# Patient Record
Sex: MALE | URBAN - METROPOLITAN AREA
[De-identification: names, ages, dates, MRNs, and addresses within clinical notes are randomized per-mention and may not be internally consistent; named-entity substitution may affect disease eponyms.]

---

## 2018-07-10 ENCOUNTER — OFFICE VISIT (OUTPATIENT)
Dept: SURGERY | Facility: CLINIC | Age: 63
End: 2018-07-10
Payer: COMMERCIAL

## 2018-07-10 VITALS
DIASTOLIC BLOOD PRESSURE: 84 MMHG | WEIGHT: 124.9 LBS | OXYGEN SATURATION: 99 % | BODY MASS INDEX: 20.81 KG/M2 | TEMPERATURE: 97.5 F | HEART RATE: 62 BPM | SYSTOLIC BLOOD PRESSURE: 145 MMHG | HEIGHT: 65 IN

## 2018-07-10 DIAGNOSIS — K62.82 ANAL DYSPLASIA: Primary | ICD-10-CM

## 2018-07-10 ASSESSMENT — PAIN SCALES - GENERAL: PAINLEVEL: NO PAIN (0)

## 2018-07-10 NOTE — PATIENT INSTRUCTIONS
Preventive Care:    Colorectal Cancer Screening: During our visit today, we discussed that it is recommended you receive colorectal cancer screening. Please call or make an appointment with your primary care provider to discuss this. You may also call the [facility] scheduling line ([phone]) to set up a colonoscopy appointment. Sherri TIJERINA LPN

## 2018-07-10 NOTE — NURSING NOTE
The following medication was given:     MEDICATION:  Lidocaine with epinephrine  ROUTE: SQ  SITE: Rectum  DOSE: 1% per 30mL  LOT #: [id number]  : Hospira  EXPIRATION DATE: 2/1/2019  NDC#: 7068899414   Was there drug waste? Yes  Amount of drug waste (mL): 25.  Reason for waste:  Single use vial      Sherri Luciano LPN  July 10, 2018

## 2018-07-10 NOTE — MR AVS SNAPSHOT
After Visit Summary   7/10/2018    Zain Reynolds    MRN: [id number]           Patient Information     Date Of Birth          1955        Visit Information        Provider Department      7/10/2018 9:45 AM Mechelle Young APRN [facility] Colon and Rectal Surgery        Today's Diagnoses     Anal dysplasia    -  1      Care Instructions    Preventive Care:    Colorectal Cancer Screening: During our visit today, we discussed that it is recommended you receive colorectal cancer screening. Please call or make an appointment with your primary care provider to discuss this. You may also call the [facility] scheduling line ([phone]) to set up a colonoscopy appointment. Sherri TIJERINA LPN                Follow-ups after your visit        Who to contact     Please call your clinic at [phone] to:    Ask questions about your health    Make or cancel appointments    Discuss your medicines    Learn about your test results    Speak to your doctor            Additional Information About Your Visit        MyChart Information     [employer] gives you secure access to your electronic health record. If you see a primary care provider, you can also send messages to your care team and make appointments. If you have questions, please call your primary care clinic.  If you do not have a primary care provider, please call [phone] and they will assist you.      [employer] is an electronic gateway that provides easy, online access to your medical records. With [employer], you can request a clinic appointment, read your test results, renew a prescription or communicate with your care team.     To access your existing account, please contact your [facility] or call [phone] for assistance.        Care EveryWhere ID     This is your Care EveryWhere ID. This could be used by other organizations to access your [facility] medical records  [id number]        Your Vitals Were      "Pulse Temperature Height Pulse Oximetry BMI (Body Mass Index)       62 97.5  F (36.4  C) (Oral) 5' 5\" 99% 20.78 kg/m2        Blood Pressure from Last 3 Encounters:   07/10/18 145/84    Weight from Last 3 Encounters:   07/10/18 124 lb 14.4 oz              We Performed the Following     HC ANOSCOPY DX, HRA W/ BIOPSY(IES)     Surgical pathology exam        Primary Care Provider    None Specified       No primary provider on file.        Equal Access to Services     PARAMJIT COX : Hadii aad ku hadasho Soomaali, waaxda luqadaha, qaybta kaalmada adeegyada, waxay roxiein hayjaretn elizabetheg camilatiffanietigist lagwendolynn . So [facility] [phone].    ATENCIÓN: Si habla español, tiene a hauser disposición servicios gratuitos de asistencia lingüística. [facility] [phone].    We comply with applicable federal civil rights laws and [place] laws. We do not discriminate on the basis of race, color, national origin, age, disability, sex, sexual orientation, or gender identity.            Thank you!     Thank you for choosing [facility] COLON AND RECTAL SURGERY  for your care. Our goal is always to provide you with excellent care. Hearing back from our patients is one way we can continue to improve our services. Please take a few minutes to complete the written survey that you may receive in the mail after your visit with us. Thank you!             Your Updated Medication List - Protect others around you: Learn how to safely use, store and throw away your medicines at www.disposemymeds.org.          This list is accurate as of 7/10/18 10:21 AM.  Always use your most recent med list.                   Brand Name Dispense Instructions for use Diagnosis    UNKNOWN TO PATIENT      Pt states takes meds for HIV, blood pressure, and cholesterol    Anal dysplasia         "

## 2018-07-10 NOTE — NURSING NOTE
"Chief Complaint   Patient presents with     High resolution anoscopy     HRA       Vitals:    07/10/18 0859   BP: 145/84   Pulse: 62   Temp: 97.5  F (36.4  C)   TempSrc: Oral   SpO2: 99%   Weight: 124 lb 14.4 oz   Height: 5' 5\"       Body mass index is 20.78 kg/(m^2).      Sherri TIJERINA LPN                        " DC instructions

## 2018-07-10 NOTE — LETTER
7/10/2018       RE: Zain Reynolds  [address]  [facility] [address]     Dear Colleague,    Thank you for referring your patient, Zain Reynolds, to the [facility] COLON AND RECTAL SURGERY at [facility]. Please see a copy of my visit note below.      Colon and Rectal Surgery Clinic High Resolution Anoscopy Note    RE: Zain Reynolds  : 1955  AKI: 7/10/2018    Zain Reynolds is a 63 year old HIV positive male with a history of HSILon anal cytology on 18 who presents today for high resolution anoscopy.     HPI: Zain has reportedly had low grade dysplasia on anal cytology in the past but has never had high resolution anoscopy. He denies any anorectal complaints. He had an anorectal abscess in  but no difficulties since that time. He quit smoking 3 years ago. He has had anoreceptive intercourse in the past but not for the past 30 years. His HIV is under good control.   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 He is on daily aspirin.  He has never had a colonoscopy, although [facility]. Family history of prostate and breast cancer, but no family history of colon cancer.    ASSESSMENT: Written, informed consent was obtained prior to procedure.  Prior to the start of the procedure and with procedural staff participation, I verbally confirmed the patient s identity using two indicators, relevant allergies, that the procedure was appropriate and matched the consent or emergent situation, and that the correct equipment/implants were available. Immediately prior to starting the procedure I conducted the Time Out with the procedural staff and re-confirmed the patient s name, procedure, and site/side. (The Joint Commission universal protocol was followed.)  Yes    Sedation (Moderate or Deep): None    Anal cytology was not obtained with Dacron swab. Digital anal rectal exam was  performed without any palpable lesions. Dilute acetic acid soak was completed for 2 minutes. Lubricant was used to insert the anoscope. Performed high resolution microscopy using the colposcope. The dentate line was viewed in its entirety and was quite subtle. Anal canal is fairly short. Abnormal areas noted were some acetowhitening in the left posterior position with fine punctation. After injection with 1% lidocaine with epinephrine, biopsy was obtained in the left posterior position using a baby Tischler forceps. Fulguration was not performed. Hemostasis was obtained using Monsel solution.  The perianal area was inspected after acetic acid soak. The findings noted were some sebaceous cysts with waxy exudate but no additional acetowhitening, punctation, or verruciform lesions.     The patient tolerated the procedures well.    PLAN: Will follow up with patient with results of biopsy from today. If low grade dysplasia or normal, follow up in 6 months for repeat high resolution anoscopy. If high grade dysplasia would recommend fulguration in the OR with repeat high resolution anoscopy after 3-4 months.  Strongly recommended a colonoscopy. Patient's questions were answered to his stated satisfaction and he is in agreement with this plan.    For details of past medical history, surgical history, family history, medications, allergies, and review of systems, please see details below.    Medical history:  No past medical history on file.    Surgical history:  No past surgical history on file.    Family history:  No family history on file.    Medications:  Current Outpatient Prescriptions   Medication Sig Dispense Refill     UNKNOWN TO PATIENT Pt states takes meds for HIV, blood pressure, and cholesterol       Allergies:  The patienthas no allergies on file.    Social history:  Social History   Substance Use Topics     Smoking status: Not on file     Smokeless tobacco: Not on file     Alcohol use Not on file     Marital  "status: single.    Review of Systems:  Nursing Notes:   Sherri Luciano LPN  7/10/2018  9:01 AM  Signed  Chief Complaint   Patient presents with     High resolution anoscopy     HRA       Vitals:    07/10/18 0859   BP: 145/84   Pulse: 62   Temp: 97.5  F (36.4  C)   TempSrc: Oral   SpO2: 99%   Weight: 124 lb 14.4 oz   Height: 5' 5\"       Body mass index is 20.78 kg/(m^2).      Sherri TIJERINA FAITH                          Sherri Luciano LPN  7/10/2018 10:00 AM  Signed  The following medication was given:     MEDICATION:  Lidocaine with epinephrine  ROUTE: SQ  SITE: Rectum  DOSE: 1% per 30mL  LOT #: [id number]  : Hospira  EXPIRATION DATE: 2/1/2019  NDC#: [id number]   Was there drug waste? Yes  Amount of drug waste (mL): 25.  Reason for waste:  Single use vial      Sherri Luciano LPN  July 10, 2018     This procedure was performed under a collaborative agreement with Dr. Riley Mooney MD, Chief of Colon and Rectal Surgery, [facility].    Mechelle Flores NP-C  Colon and Rectal Surgery  [facility]      This note was created using speech recognition software and may contain unintended word substitutions.         "

## 2018-07-10 NOTE — PROGRESS NOTES
Colon and Rectal Surgery Clinic High Resolution Anoscopy Note    RE: Zain Reynolds  : 1955  AKI: 7/10/2018    Zain Reynolds is a 63 year old HIV positive male with a history of HSILon anal cytology on 18 who presents today for high resolution anoscopy.     HPI: Zain has reportedly had low grade dysplasia on anal cytology in the past but has never had high resolution anoscopy. He denies any anorectal complaints. He had an anorectal abscess in  but no difficulties since that time. He quit smoking 3 years ago. He has had anoreceptive intercourse in the past but not for the past 30 years. His HIV is under good control.   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 He is on daily aspirin.  He has never had a colonoscopy, although [facility]. Family history of prostate and breast cancer, but no family history of colon cancer.    ASSESSMENT: Written, informed consent was obtained prior to procedure.  Prior to the start of the procedure and with procedural staff participation, I verbally confirmed the patient s identity using two indicators, relevant allergies, that the procedure was appropriate and matched the consent or emergent situation, and that the correct equipment/implants were available. Immediately prior to starting the procedure I conducted the Time Out with the procedural staff and re-confirmed the patient s name, procedure, and site/side. (The Joint Commission universal protocol was followed.)  Yes    Sedation (Moderate or Deep): None    Anal cytology was not obtained with Dacron swab. Digital anal rectal exam was performed without any palpable lesions. Dilute acetic acid soak was completed for 2 minutes. Lubricant was used to insert the anoscope. Performed high resolution microscopy using the colposcope. The dentate line was viewed in its entirety and was quite subtle. Anal canal is fairly short. Abnormal areas  noted were some acetowhitening in the left posterior position with fine punctation. After injection with 1% lidocaine with epinephrine, biopsy was obtained in the left posterior position using a baby Tischler forceps. Fulguration was not performed. Hemostasis was obtained using Monsel solution.  The perianal area was inspected after acetic acid soak. The findings noted were some sebaceous cysts with waxy exudate but no additional acetowhitening, punctation, or verruciform lesions.     The patient tolerated the procedures well.    PLAN: Will follow up with patient with results of biopsy from today. If low grade dysplasia or normal, follow up in 6 months for repeat high resolution anoscopy. If high grade dysplasia would recommend fulguration in the OR with repeat high resolution anoscopy after 3-4 months.  Strongly recommended a colonoscopy. Patient's questions were answered to his stated satisfaction and he is in agreement with this plan.    For details of past medical history, surgical history, family history, medications, allergies, and review of systems, please see details below.    Medical history:  No past medical history on file.    Surgical history:  No past surgical history on file.    Family history:  No family history on file.    Medications:  Current Outpatient Prescriptions   Medication Sig Dispense Refill     UNKNOWN TO PATIENT Pt states takes meds for HIV, blood pressure, and cholesterol       Allergies:  The patienthas no allergies on file.    Social history:  Social History   Substance Use Topics     Smoking status: Not on file     Smokeless tobacco: Not on file     Alcohol use Not on file     Marital status: single.    Review of Systems:  Nursing Notes:   Sherri Luciano LPN  7/10/2018  9:01 AM  Signed  Chief Complaint   Patient presents with     High resolution anoscopy     HRA       Vitals:    07/10/18 0859   BP: 145/84   Pulse: 62   Temp: 97.5  F (36.4  C)   TempSrc: Oral   SpO2: 99%   Weight: 124  "lb 14.4 oz   Height: 5' 5\"       Body mass index is 20.78 kg/(m^2).      FAITH VenturaSherri juarez, LPN  7/10/2018 10:00 AM  Signed  The following medication was given:     MEDICATION:  Lidocaine with epinephrine  ROUTE: SQ  SITE: Rectum  DOSE: 1% per 30mL  LOT #: [id number]  : Hospira  EXPIRATION DATE: 2/1/2019  NDC#: [id number]   Was there drug waste? Yes  Amount of drug waste (mL): 25.  Reason for waste:  Single use vial      Sherri AnkitaFAITH winchester  July 10, 2018     This procedure was performed under a collaborative agreement with Dr. Riley Mooney MD, Chief of Colon and Rectal Surgery, [facility].    Mechelle Flores NP-C  Colon and Rectal Surgery  [facility]      This note was created using speech recognition software and may contain unintended word substitutions.    "

## 2018-07-11 ENCOUNTER — TELEPHONE (OUTPATIENT)
Dept: SURGERY | Facility: CLINIC | Age: 63
End: 2018-07-11

## 2018-07-11 DIAGNOSIS — K62.82 ANAL DYSPLASIA: Primary | ICD-10-CM

## 2018-07-11 LAB — COPATH REPORT: NORMAL

## 2018-07-11 ASSESSMENT — PATIENT HEALTH QUESTIONNAIRE - PHQ9: SUM OF ALL RESPONSES TO PHQ QUESTIONS 1-9: 4

## 2018-07-11 NOTE — TELEPHONE ENCOUNTER
a. Does the patient take five or more prescription medications? No  b. Does patient have difficulty walking up two flights of stairs? No  c. Is patient s BMI 35 or greater? No  d. Is patient an insulin dependent diabetic? No  e. Does patient have a history of having a heart attack or a heart surgery of any kind? No  f. Does patient have a history of heart failure? No  g. Does the patient have a history of any type of transplant? No  h. Does the patient use inhalers on a daily basis? No  i. Does the patient have a history of pulmonary hypertension? No  Once the patient is evaluated by PAC contact (ex: Surgery schedulers name and number, RN's name and number, etc..);      Mariia Spears RNCC- Colon and Rectal Surgery  Name of planned surgery: ___________________high resolution anoscopy ___________

## 2018-07-11 NOTE — TELEPHONE ENCOUNTER
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 Would recommend repeat high resolution anoscopy in the OR with fulguration. Patient's questions were answered to his stated satisfaction and he is in agreement with this plan.    REJI Fuchs, NP-C  Colon and Rectal Surgery  [facility]

## 2018-07-16 ENCOUNTER — TRANSFERRED RECORDS (OUTPATIENT)
Dept: HEALTH INFORMATION MANAGEMENT | Facility: CLINIC | Age: 63
End: 2018-07-16

## 2018-08-13 ENCOUNTER — OFFICE VISIT (OUTPATIENT)
Dept: SURGERY | Facility: CLINIC | Age: 63
End: 2018-08-13
Payer: COMMERCIAL

## 2018-08-13 ENCOUNTER — APPOINTMENT (OUTPATIENT)
Dept: SURGERY | Facility: CLINIC | Age: 63
End: 2018-08-13
Payer: COMMERCIAL

## 2018-08-13 ENCOUNTER — ALLIED HEALTH/NURSE VISIT (OUTPATIENT)
Dept: SURGERY | Facility: CLINIC | Age: 63
End: 2018-08-13
Payer: COMMERCIAL

## 2018-08-13 ENCOUNTER — ANESTHESIA EVENT (OUTPATIENT)
Dept: SURGERY | Facility: AMBULATORY SURGERY CENTER | Age: 63
End: 2018-08-13

## 2018-08-13 VITALS
HEIGHT: 65 IN | HEART RATE: 69 BPM | OXYGEN SATURATION: 99 % | SYSTOLIC BLOOD PRESSURE: 152 MMHG | WEIGHT: 124.9 LBS | TEMPERATURE: 97.6 F | DIASTOLIC BLOOD PRESSURE: 86 MMHG | BODY MASS INDEX: 20.81 KG/M2 | RESPIRATION RATE: 18 BRPM

## 2018-08-13 DIAGNOSIS — K62.82 ANAL DYSPLASIA: ICD-10-CM

## 2018-08-13 DIAGNOSIS — Z01.818 PRE-OP EXAMINATION: Primary | ICD-10-CM

## 2018-08-13 LAB
ALBUMIN SERPL-MCNC: 3.7 G/DL (ref 3.4–5)
ALP SERPL-CCNC: 112 U/L (ref 40–150)
ALT SERPL W P-5'-P-CCNC: 16 U/L (ref 0–70)
ANION GAP SERPL CALCULATED.3IONS-SCNC: 6 MMOL/L (ref 3–14)
AST SERPL W P-5'-P-CCNC: 15 U/L (ref 0–45)
BILIRUB SERPL-MCNC: 0.6 MG/DL (ref 0.2–1.3)
BUN SERPL-MCNC: 19 MG/DL (ref 7–30)
CALCIUM SERPL-MCNC: 8.6 MG/DL (ref 8.5–10.1)
CHLORIDE SERPL-SCNC: 108 MMOL/L (ref 94–109)
CO2 SERPL-SCNC: 26 MMOL/L (ref 20–32)
CREAT SERPL-MCNC: 1.52 MG/DL (ref 0.66–1.25)
ERYTHROCYTE [DISTWIDTH] IN BLOOD BY AUTOMATED COUNT: 13.8 % (ref 10–15)
GFR SERPL CREATININE-BSD FRML MDRD: 46 ML/MIN/1.7M2
GLUCOSE SERPL-MCNC: 88 MG/DL (ref 70–99)
HCT VFR BLD AUTO: 46.4 % (ref 40–53)
HGB BLD-MCNC: 15.3 G/DL (ref 13.3–17.7)
INR PPP: 0.95 (ref 0.86–1.14)
MCH RBC QN AUTO: 32.3 PG (ref 26.5–33)
MCHC RBC AUTO-ENTMCNC: 33 G/DL (ref 31.5–36.5)
MCV RBC AUTO: 98 FL (ref 78–100)
PLATELET # BLD AUTO: 149 10E9/L (ref 150–450)
POTASSIUM SERPL-SCNC: 4 MMOL/L (ref 3.4–5.3)
PROT SERPL-MCNC: 7 G/DL (ref 6.8–8.8)
RBC # BLD AUTO: 4.73 10E12/L (ref 4.4–5.9)
SODIUM SERPL-SCNC: 139 MMOL/L (ref 133–144)
WBC # BLD AUTO: 5.6 10E9/L (ref 4–11)

## 2018-08-13 RX ORDER — ASPIRIN 325 MG
81 TABLET ORAL EVERY MORNING
COMMUNITY

## 2018-08-13 ASSESSMENT — LIFESTYLE VARIABLES: TOBACCO_USE: 1

## 2018-08-13 NOTE — MR AVS SNAPSHOT
After Visit Summary   2018    Zain Reynolds    MRN: [id number]           Patient Information     Date Of Birth          1955        Visit Information        Provider Department      2018 11:30 AM Rn, [facility]        Care Instructions    Preparing for Your Surgery      Name:  Zain Reynolds   MRN:  [id number]   :  1955   Today's Date:  2018     Arriving for surgery:  Surgery date:  18  Arrival time:  09:50 am  Please come to:       [facility] [address] [place] [address]    What can I eat or drink?-  You may have solid food or milk products until 8 hours prior to your surgery.  -  You may have water, apple juice or 7up/Sprite until 2 hours prior to your surgery.    Which medicines can I take?    Stop Aspirin, vitamins and supplements one week prior to surgery.  Hold Ibuprofen and Naproxen for 24 hours prior to surgery.   -  Do NOT take these medications in the morning, the day of surgery:      -  Please take these medications the day of surgery:  Tylenol if needed.  Take metoprolol if normally taken in the morning.  Take all other scheduled morning medications.    How do I prepare myself?  -  Take two showers: one the night before surgery; and one the morning of surgery.         Use Scrubcare or Hibiclens to wash from neck down.  You may use your own     shampoo and conditioner. No other hair products.   -  Do NOT use lotion, powder, deodorant, or antiperspirant the day of your surgery.  -  Do NOT wear any jewlery.    - Do not bring your own medications to the hospital, except for inhalers and eye   drops.  -  Bring your ID and insurance card.    Questions or Concerns:  -If you have questions or concerns regarding the day of surgery, please call [phone].     -If you are scheduled at the Ambulatory Surgery Center please call [phone].    -For questions after surgery  please call your surgeons office.                     Follow-ups after your visit        Your next 10 appointments already scheduled     Aug 13, 2018 12:00 PM CDT   (Arrive by 11:45 AM)   PAC Anesthesia Consult with  Pac Anesthesiologist   [facility] ([facility])    [address] [place] [address]  [address] [facility] [address] [phone]            Aug 17, 2018   Procedure with Riley Mooney MD   [facility] ([facility])    [address] [place] [address]  [address] [facility] [address] [phone]           Located in the Clinics and Surgery Center at [address] [place] [address].   parking is very convenient and highly recommended.  is a $6 flat rate fee.  Both  and self parkers should enter the main arrival plaza from [facility]; parking attendants will direct you based on your parking preference.              Future tests that were ordered for you today     Open Future Orders        Priority Expected Expires Ordered    Comprehensive metabolic panel Routine 8/13/2018 9/12/2018 8/13/2018    CBC with platelets Routine 8/13/2018 9/12/2018 8/13/2018    INR Routine 8/13/2018 9/12/2018 8/13/2018            Who to contact     Please call your clinic at [phone] to:    Ask questions about your health    Make or cancel appointments    Discuss your medicines    Learn about your test results    Speak to your doctor            Additional Information About Your Visit        [employer] Information     [employer] gives you secure access to your electronic health record. If you see a primary care provider, you can also send messages to your care team and make appointments. If you have questions, please call your primary care clinic.  If you do not have a primary care provider, please call [phone] and they will assist you.      [employer] is an electronic gateway that provides easy, online access  to your medical records. With [employer], you can request a clinic appointment, read your test results, renew a prescription or communicate with your care team.     To access your existing account, please contact your [facility] or call [phone] for assistance.        Care EveryWhere ID     This is your Care EveryWhere ID. This could be used by other organizations to access your [facility] medical records  [id number]         Blood Pressure from Last 3 Encounters:   08/13/18 152/86   07/10/18 145/84    Weight from Last 3 Encounters:   08/13/18 56.7 kg (124 lb 14.4 oz)   07/10/18 56.7 kg (124 lb 14.4 oz)              Today, you had the following     No orders found for display       Primary Care Provider    None Specified       No primary provider on file.        Equal Access to Services     PARAMJIT COX : Christian Sheth, wafidelina kamara, eder kaalmajesse valdovinos, chioma schmidt . So [facility] [phone].    ATENCIÓN: Si habla español, tiene a hauser disposición servicios gratuitos de asistencia lingüística. Llame al [phone].    We comply with applicable federal civil rights laws and [place] laws. We do not discriminate on the basis of race, color, national origin, age, disability, sex, sexual orientation, or gender identity.            Thank you!     Thank you for choosing [facility]  for your care. Our goal is always to provide you with excellent care. Hearing back from our patients is one way we can continue to improve our services. Please take a few minutes to complete the written survey that you may receive in the mail after your visit with us. Thank you!             Your Updated Medication List - Protect others around you: Learn how to safely use, store and throw away your medicines at www.disposemymeds.org.          This list is accurate as of 8/13/18 11:38 AM.  Always use your most recent med list.                    Brand Name Dispense Instructions for use Diagnosis    aspirin 325 MG tablet      Take 325 mg by mouth every morning        FLEXERIL PO      Take by mouth At Bedtime        K-PHOS PO      Take by mouth every morning        LIPITOR PO      Take by mouth At Bedtime        METOPROLOL TARTRATE PO      Take by mouth 2 times daily        MIRTAZAPINE PO      Take by mouth At Bedtime

## 2018-08-13 NOTE — ANESTHESIA PREPROCEDURE EVALUATION
Anesthesia Evaluation     . Pt has had prior anesthetic. Type: General    No history of anesthetic complications          ROS/MED HX    ENT/Pulmonary: Comment: Precancerous lesion on tongue with surgical excision ~ 2 years ago.    (+)tobacco use (Smoked 1-2 PPD for 39 years.  ), Past use , . .    Neurologic:  - neg neurologic ROS     Cardiovascular: Comment: S/P carotid enterectomy.  Right side ~2003 and left side ~2015.      (+) Dyslipidemia, hypertension--CAD, -CABG-date: 3/3/2003 , . Taking blood thinners : . . . :. . 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  Multiple steps throughout the day.   >4 METS   Hematologic:     (+) History of Transfusion -      Musculoskeletal:   (+) arthritis (Back and right wrist. ), , , -       GI/Hepatic:     (+) hepatitis (Unsure)       Renal/Genitourinary:  - ROS Renal section negative       Endo:  - neg endo ROS       Psychiatric:     (+) psychiatric history anxiety      Infectious Disease:   (+) HIV,       Malignancy:      - no malignancy   Other:    (+) No chance of pregnancy no H/O Chronic Pain,no other significant disability                    Physical Exam  Normal systems: cardiovascular and pulmonary    Airway   Mallampati: I  TM distance: >3 FB  Neck ROM: full    Dental   (+) upper dentures and lower dentures    Cardiovascular   Rhythm and rate: regular and normal      Pulmonary    breath sounds clear to auscultation               PAC Discussion and Assessment    ASA Classification: 3  Case is suitable for: ASC  Anesthetic techniques and relevant risks discussed:   Invasive monitoring and risk discussed:   Types:   Possibility and Risk of blood transfusion discussed:   NPO instructions given:   Additional anesthetic preparation and risks discussed:   Needs early admission to pre-op area:   Other:     PAC Resident/NP Anesthesia  Assessment:  Zain Reynolds is a 63-year-old male scheduled for Examination Under Anesthesia with High Resolution Anoscopy on 8/17/18 with Dr. Mooney at the [facility] under MAC.  Mr. 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  He underwent high resolution anoscopy with biopsy.  Biopsy demonstrated AIN 2.  Above procedure recommended.     Mr. Reynolds presents to PAC with his significant other. Limited records available to me [facility].  He reports he had CABG X3 in 2003 with subsequent right carotid endarterectomy.  He had a left carotid endarterectomy in 2015.  He denies any chest pain, SOB, palpitations, syncope, MONDRAGON, orthopnea, or PND.  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  He reports a h/o tongue dysplasia in the past with surgical intervention without further issues.  He reports a distant history of hepatitis that resolved.  He denies any changes in his bowel or bladder habits.  He would like to proceed with above procedure.      He has the following specific operative considerations:   - METS:  >4. RCRI : Coronary Artery Disease (MI, positive stress test, angina, Qs on EKG).  0.9 % risk of major adverse cardiac event..  - HERNANDO # of risks 3/8 = intermediate  - VTE risk:  0.5-3%.  Increased VTE risk: Recommend use of mechanical/chemical VTE prophylaxis in the tamiko- and postoperative periods.    - Risk of PONV score = 1.  If > 2, anti-emetic intervention recommended.    1.  Cardiology - Known CAD, s/p CABG 3/3/2003.  HTN and HLD. Denies cardiac symptoms.  EKG NSR 69 bpm with non-specific T wave abnormality.  [facility]  Take BB and statin as prescribed DOS.  Hold ASA for 5 days prior to surgery.         - Carotid artery disease, s/p bilateral endarectomy.  Denies any symptoms.      2.  Pulmonary - Remote smoking  cigarette hx.  Marijuana use ~2X/month  3.  Hematology - HIV positive.    4.  GI - anal dysplasia, above surgery planned       - H/O hepatitis, reports resolved without treatment.        - CKD: Suggest that nephrotoxic substances and medications be avoided.  Recommend close monitoring of fluid balance and electrolytes throughout the perioperative period.    5.  HEENT - reports h/o tongue dysplasia, s/p surgical intervention in the past without recurrence  6.  MSK - chronic low back pain, takes cyclobenzaprine as needed  7.  Nuero, Anxiety, take antianxiety DOS.      - Anesthesia considerations:  Refer to PAC assessment in anesthesia records  - CBC & CMP today      Arrival time, NPO, shower and medication instructions provided by nursing staff today.  Preparing For Your Surgery handout given.  Patient was discussed with Dr Jerome. I spent 20 minutes face to face with patient assessing, educating, counseling and/or coordinating care and examining the patient.  Of that 20 minutes, I spent greater than 50% of my time counseling and/or coordinating care.            Reviewed and Signed by PAC Mid-Level Provider/Resident  Mid-Level Provider/Resident: Frances MENDOZA CNP  Date: 8/13/18  Time: 11:07    Attending Anesthesiologist Anesthesia Assessment:  63 year old for EUA with anoscopy in management of anal dysplasia in the setting of HIV. He has known CAD with CABG in 2003 and carotid endarterectomies (bilateral). [facility], no apparent cardiac issues at this time. No further workup indicated in the low risk procedure.    Patient/case discussed with [place]. No need to see patient. Patient is appropriate for the planned procedure without further work-up or medical management.      Reviewed and Signed by PAC Anesthesiologist  Anesthesiologist: elma  Date: 8/13/2018  Time:   Pass/Fail: Pass  Disposition:     PAC Pharmacist Assessment:        Pharmacist:   Date:   Time:      Anesthesia Plan      History & Physical  Review  History and physical reviewed and following examination; no interval change.    ASA Status:  3 .    NPO Status:  > 6 hours    Plan for MAC with Intravenous and Propofol induction. Maintenance will be TIVA.  Reason for MAC:  Deep or markedly invasive procedure (G8)  PONV prophylaxis:  Ondansetron (or other 5HT-3)       Postoperative Care  Postoperative pain management:  IV analgesics.      Consents  Anesthetic plan, risks, benefits and alternatives discussed with:  Patient..                History and physical assessed; Patient examined.   Risks and alternatives presented and discussed. Patient and family agree. All questions answered.      Anival Brooks MD  Staff Anesthesiologist  [phone]

## 2018-08-13 NOTE — PATIENT INSTRUCTIONS
Preparing for Your Surgery      Name:  Zain Reynolds   MRN:  [id number]   :  1955   Today's Date:  2018     Arriving for surgery:  Surgery date:  18  Arrival time:  09:50 am  Please come to:       [facility] [address]    [address] [place] [address]    What can I eat or drink?-  You may have solid food or milk products until 8 hours prior to your surgery.  -  You may have water, apple juice or 7up/Sprite until 2 hours prior to your surgery.    Which medicines can I take?    Stop Aspirin, vitamins and supplements one week prior to surgery.  Hold Ibuprofen and Naproxen for 24 hours prior to surgery.   -  Do NOT take these medications in the morning, the day of surgery:      -  Please take these medications the day of surgery:  Tylenol if needed.  Take metoprolol if normally taken in the morning.  Take all other scheduled morning medications.    How do I prepare myself?  -  Take two showers: one the night before surgery; and one the morning of surgery.         Use Scrubcare or Hibiclens to wash from neck down.  You may use your own     shampoo and conditioner. No other hair products.   -  Do NOT use lotion, powder, deodorant, or antiperspirant the day of your surgery.  -  Do NOT wear any jewlery.    - Do not bring your own medications to the hospital, except for inhalers and eye   drops.  -  Bring your ID and insurance card.    Questions or Concerns:  -If you have questions or concerns regarding the day of surgery, please call [phone].     -If you are scheduled at the [facility] please call [phone].    -For questions after surgery please call your surgeons office.

## 2018-08-13 NOTE — H&P
Pre-Operative H & P     CC:  Preoperative exam to assess for increased cardiopulmonary risk while undergoing surgery and anesthesia.    Date of Encounter: 8/13/2018  Primary Care Physician:  No primary care provider on file.  Reason for visit:     Anal dysplasia      HPI  Zain Reynolds is a 63 year old male who presents for pre-operative H & P in preparation for Examination Under Anesthesia with High Resolution Anoscopy on 8/17/18 with Dr. Mooney at the [facility] under MAC.  Mr. 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  He underwent high resolution anoscopy with biopsy.  Biopsy demonstrated AIN 2.  Above procedure recommended.     Mr. Reynolds presents to PAC with his significant other. Limited records available to me [facility].  He reports he had CABG X3 in 2003 with subsequent right carotid endarterectomy.  He had a left carotid endarterectomy in 2015.  He denies any chest pain, SOB, palpitations, syncope, MONDRAGON, orthopnea, or PND.  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  He reports a h/o tongue dysplasia in the past with surgical intervention without further issues.  He reports a distant history of hepatitis that resolved.  He denies any changes in his bowel or bladder habits.  He would like to proceed with above procedure.       History is obtained from the patient, electronic health record and patient's significant other.     Past Medical History  Past Medical History:   Diagnosis Date     Anal dysplasia      CAD (coronary artery disease)      Cataracts, bilateral      Hepatitis      HIV (human immunodeficiency virus infection) (H)      Hyperlipemia      Hypertension      Tongue dysplasia        Past Surgical History  Past Surgical History:   Procedure Laterality Date     C CABG, ARTERY-VEIN, TWO  03/03/2003     CATARACT IOL, RT/LT        endarectomy Left 2015     endartectomy Right 2003       Hx of Blood transfusions/reactions: yes without reaction     Hx of abnormal bleeding or anti-platelet use: ASA    Steroid use in the last year: denies    Personal or FH with difficulty with Anesthesia:  denies    Prior to Admission Medications  Current Outpatient Prescriptions   Medication Sig Dispense Refill     aspirin 325 MG tablet Take 325 mg by mouth every morning       Atorvastatin Calcium (LIPITOR PO) Take by mouth At Bedtime       Cyclobenzaprine HCl (FLEXERIL PO) Take by mouth At Bedtime       METOPROLOL TARTRATE PO Take by mouth 2 times daily       MIRTAZAPINE PO Take by mouth At Bedtime       Potassium Phosphate Monobasic (K-PHOS PO) Take by mouth every morning         Allergies  No Known Allergies    Social History  Social History     Social History     Marital status: Single     Spouse name: N/A     Number of children: N/A     Years of education: N/A     Occupational History     CNA      Social History Main Topics     Smoking status: Former Smoker     Packs/day: 1.50     Years: 39.00     Types: Cigarettes     Smokeless tobacco: Never Used     Alcohol use Yes      Comment: Rare     Drug use: Yes     Special: Marijuana      Comment: Rare - couple times per month     Sexual activity: Not on file     Other Topics Concern     Not on file     Social History Narrative    In relationship since 1993. Lives in own home.       Family History  Family History   Problem Relation Age of Onset     Alzheimer Disease Mother      Coronary Artery Disease Father      ROS/MED HX    ENT/Pulmonary: Comment: Precancerous lesion on tongue with surgical excision ~ 2 years ago.    (+)tobacco use (Smoked 1-2 PPD for 39 years.  ), Past use , . .    Neurologic:  - neg neurologic ROS     Cardiovascular: Comment: S/P carotid enterectomy.  Right side ~2003 and left side ~2015.      (+) Dyslipidemia, hypertension--CAD, -CABG-date: 3/3/2003 , . Taking blood thinners : . . . :. .  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   >4 METS   Hematologic:     (+) History of Transfusion -      Musculoskeletal:   (+) arthritis (Back and right wrist. ), , , -       GI/Hepatic:     (+) hepatitis (Unsure)       Renal/Genitourinary:  - ROS Renal section negative       Endo:  - neg endo ROS       Psychiatric:     (+) psychiatric history anxiety      Infectious Disease:   (+) HIV,       Malignancy:      - no malignancy   Other:    (+) No chance of pregnancy no H/O Chronic Pain,no other significant disability        Temp: 97.6  F (36.4  C) Temp src: Oral BP: 152/86 Pulse: 69   Resp: 18 SpO2: 99 %         124 lbs 14.4 oz  5' 5\"   Body mass index is 20.78 kg/(m^2).       Physical Exam  Constitutional: Awake, alert, cooperative, no apparent distress, and appears stated age.  Eyes: Pupils equal, round and reactive to light, extra ocular muscles intact, sclera clear, conjunctiva normal.  HENT: Normocephalic, oral pharynx with moist mucus membranes, upper and lower dentures. . No goiter appreciated.   Respiratory: Clear to auscultation bilaterally, no crackles or wheezing.  Cardiovascular: Regular rate and rhythm, normal S1 and S2, and no murmur noted.  Carotids +2, no bruits. No edema. Palpable pulses to radial  DP and PT arteries. Well healed mid-sternal scar.    GI: Normal bowel sounds, soft, non-distended, non-tender, no masses palpated, no hepatosplenomegaly.    Lymph/Hematologic: No cervical lymphadenopathy and no supraclavicular lymphadenopathy.  Genitourinary:  na  Skin: Warm and dry.    Musculoskeletal: Full ROM of neck. There is no redness, warmth, or swelling of the joints. Gross motor strength is normal.    Neurologic: Awake, alert, oriented to name, place and time. Cranial nerves II-XII are grossly intact. Gait is normal. "   Neuropsychiatric: Calm, cooperative. Normal affect.     Labs: (personally reviewed)  Lab Results   Component Value Date    WBC 5.6 08/13/2018     Lab Results   Component Value Date    RBC 4.73 08/13/2018     Lab Results   Component Value Date    HGB 15.3 08/13/2018     Lab Results   Component Value Date    HCT 46.4 08/13/2018     Lab Results   Component Value Date    MCV 98 08/13/2018     Lab Results   Component Value Date    MCH 32.3 08/13/2018     Lab Results   Component Value Date    MCHC 33.0 08/13/2018     Lab Results   Component Value Date    RDW 13.8 08/13/2018     Lab Results   Component Value Date     08/13/2018       Last Comprehensive Metabolic Panel:  Sodium   Date Value Ref Range Status   08/13/2018 139 133 - 144 mmol/L Final     Potassium   Date Value Ref Range Status   08/13/2018 4.0 3.4 - 5.3 mmol/L Final     Chloride   Date Value Ref Range Status   08/13/2018 108 94 - 109 mmol/L Final     Carbon Dioxide   Date Value Ref Range Status   08/13/2018 26 20 - 32 mmol/L Final     Anion Gap   Date Value Ref Range Status   08/13/2018 6 3 - 14 mmol/L Final     Glucose   Date Value Ref Range Status   08/13/2018 88 70 - 99 mg/dL Final     Urea Nitrogen   Date Value Ref Range Status   08/13/2018 19 7 - 30 mg/dL Final     Creatinine   Date Value Ref Range Status   08/13/2018 1.52 (H) 0.66 - 1.25 mg/dL Final     GFR Estimate   Date Value Ref Range Status   08/13/2018 46 (L) >60 mL/min/1.7m2 Final     Comment:     Non  GFR Calc     Calcium   Date Value Ref Range Status   08/13/2018 8.6 8.5 - 10.1 mg/dL Final       Lab Results   Component Value Date    AST 15 08/13/2018     Lab Results   Component Value Date    ALT 16 08/13/2018     No results found for: BILICONJ   Lab Results   Component Value Date    BILITOTAL 0.6 08/13/2018     Lab Results   Component Value Date    ALBUMIN 3.7 08/13/2018     Lab Results   Component Value Date    PROTTOTAL 7.0 08/13/2018      Lab Results   Component Value  Date    ALKPHOS 112 08/13/2018       EKG: Personally reviewed but formal cardiology read pending: NSR 69 bpm Nonspecific T wave abnormality    ASSESSMENT and PLAN  Zain Reynolds is a 63 year old male scheduled to undergo Examination Under Anesthesia with High Resolution Anoscopy on 8/17/18 with Dr. Mooney at the [facility] under MAC.     He has the following specific operative considerations:   - METS:  >4. RCRI : Coronary Artery Disease (MI, positive stress test, angina, Qs on EKG).  0.9 % risk of major adverse cardiac event..  - HERNANDO # of risks 3/8 = intermediate  - VTE risk:  0.5-3%.  Increased VTE risk: Recommend use of mechanical/chemical VTE prophylaxis in the tamiko- and postoperative periods.    - Risk of PONV score = 1.  If > 2, anti-emetic intervention recommended.    1.  Cardiology - Known CAD, s/p CABG 3/3/2003.  HTN and HLD. Denies cardiac symptoms.  EKG NSR 69 bpm with non-specific T wave abnormality.  [facility]  Take BB and statin as prescribed DOS.  Hold ASA for 5 days prior to surgery.         - Carotid artery disease, s/p bilateral endarectomy.  Denies any symptoms.      2.  Pulmonary - Remote cigarette hx.  Marijuana use ~2X/month  3.  Hematology - HIV positive.    4.  GI - anal dysplasia, above surgery planned       - H/O hepatitis, reports resolved without treatment.        - CKD: Suggest that nephrotoxic substances and medications be avoided.  Recommend close monitoring of fluid balance and electrolytes throughout the perioperative period.    5.  HEENT - reports h/o tongue dysplasia, s/p surgical intervention in the past without recurrence  6.  MSK - chronic low back pain, takes cyclobenzaprine as needed  7.  Nuero, Anxiety, take antianxiety DOS.      - Anesthesia considerations:  Refer to PAC assessment in anesthesia records  - CBC & CMP today      Arrival time, NPO, shower and medication instructions provided by nursing staff today.  Preparing For Your Surgery handout  given.  Patient was discussed with Dr Jerome. I spent 20 minutes face to face with patient assessing, educating, counseling and/or coordinating care and examining the patient.  Of that 20 minutes, I spent greater than 50% of my time counseling and/or coordinating care.        REJI Wallace CNP  Preoperative Assessment Center  [facility]  Phone: [phone]  Fax: [phone]

## 2018-08-14 LAB — INTERPRETATION ECG - MUSE: NORMAL

## 2018-08-17 ENCOUNTER — ANESTHESIA (OUTPATIENT)
Dept: SURGERY | Facility: AMBULATORY SURGERY CENTER | Age: 63
End: 2018-08-17

## 2018-08-17 ENCOUNTER — SURGERY (OUTPATIENT)
Age: 63
End: 2018-08-17

## 2018-08-17 ENCOUNTER — HOSPITAL ENCOUNTER (OUTPATIENT)
Facility: AMBULATORY SURGERY CENTER | Age: 63
End: 2018-08-17
Attending: COLON & RECTAL SURGERY
Payer: COMMERCIAL

## 2018-08-17 VITALS
HEIGHT: 65 IN | SYSTOLIC BLOOD PRESSURE: 128 MMHG | RESPIRATION RATE: 16 BRPM | TEMPERATURE: 97.3 F | HEART RATE: 65 BPM | OXYGEN SATURATION: 98 % | DIASTOLIC BLOOD PRESSURE: 66 MMHG | BODY MASS INDEX: 20.76 KG/M2 | WEIGHT: 124.6 LBS

## 2018-08-17 RX ORDER — PROPOFOL 10 MG/ML
INJECTION, EMULSION INTRAVENOUS CONTINUOUS PRN
Status: DISCONTINUED | OUTPATIENT
Start: 2018-08-17 | End: 2018-08-17

## 2018-08-17 RX ORDER — SODIUM CHLORIDE, SODIUM LACTATE, POTASSIUM CHLORIDE, CALCIUM CHLORIDE 600; 310; 30; 20 MG/100ML; MG/100ML; MG/100ML; MG/100ML
INJECTION, SOLUTION INTRAVENOUS CONTINUOUS
Status: DISCONTINUED | OUTPATIENT
Start: 2018-08-17 | End: 2018-08-18 | Stop reason: HOSPADM

## 2018-08-17 RX ORDER — ONDANSETRON 2 MG/ML
4 INJECTION INTRAMUSCULAR; INTRAVENOUS EVERY 30 MIN PRN
Status: DISCONTINUED | OUTPATIENT
Start: 2018-08-17 | End: 2018-08-18 | Stop reason: HOSPADM

## 2018-08-17 RX ORDER — ACETAMINOPHEN 325 MG/1
975 TABLET ORAL ONCE
Status: COMPLETED | OUTPATIENT
Start: 2018-08-17 | End: 2018-08-17

## 2018-08-17 RX ORDER — ACETAMINOPHEN 325 MG/1
975 TABLET ORAL ONCE
Status: DISCONTINUED | OUTPATIENT
Start: 2018-08-17 | End: 2018-08-17

## 2018-08-17 RX ORDER — NALOXONE HYDROCHLORIDE 0.4 MG/ML
.1-.4 INJECTION, SOLUTION INTRAMUSCULAR; INTRAVENOUS; SUBCUTANEOUS
Status: DISCONTINUED | OUTPATIENT
Start: 2018-08-17 | End: 2018-08-18 | Stop reason: HOSPADM

## 2018-08-17 RX ORDER — SODIUM CHLORIDE, SODIUM LACTATE, POTASSIUM CHLORIDE, CALCIUM CHLORIDE 600; 310; 30; 20 MG/100ML; MG/100ML; MG/100ML; MG/100ML
INJECTION, SOLUTION INTRAVENOUS CONTINUOUS PRN
Status: DISCONTINUED | OUTPATIENT
Start: 2018-08-17 | End: 2018-08-17

## 2018-08-17 RX ORDER — PROPOFOL 10 MG/ML
INJECTION, EMULSION INTRAVENOUS PRN
Status: DISCONTINUED | OUTPATIENT
Start: 2018-08-17 | End: 2018-08-17

## 2018-08-17 RX ORDER — MEPERIDINE HYDROCHLORIDE 25 MG/ML
12.5 INJECTION INTRAMUSCULAR; INTRAVENOUS; SUBCUTANEOUS
Status: DISCONTINUED | OUTPATIENT
Start: 2018-08-17 | End: 2018-08-18 | Stop reason: HOSPADM

## 2018-08-17 RX ORDER — BUPIVACAINE HYDROCHLORIDE AND EPINEPHRINE 2.5; 5 MG/ML; UG/ML
INJECTION, SOLUTION INFILTRATION; PERINEURAL PRN
Status: DISCONTINUED | OUTPATIENT
Start: 2018-08-17 | End: 2018-08-17 | Stop reason: HOSPADM

## 2018-08-17 RX ORDER — FENTANYL CITRATE 50 UG/ML
25-50 INJECTION, SOLUTION INTRAMUSCULAR; INTRAVENOUS
Status: DISCONTINUED | OUTPATIENT
Start: 2018-08-17 | End: 2018-08-17 | Stop reason: HOSPADM

## 2018-08-17 RX ORDER — KETOROLAC TROMETHAMINE 30 MG/ML
INJECTION, SOLUTION INTRAMUSCULAR; INTRAVENOUS PRN
Status: DISCONTINUED | OUTPATIENT
Start: 2018-08-17 | End: 2018-08-17

## 2018-08-17 RX ORDER — ONDANSETRON 4 MG/1
4 TABLET, ORALLY DISINTEGRATING ORAL EVERY 30 MIN PRN
Status: DISCONTINUED | OUTPATIENT
Start: 2018-08-17 | End: 2018-08-18 | Stop reason: HOSPADM

## 2018-08-17 RX ORDER — ONDANSETRON 4 MG/1
4 TABLET, ORALLY DISINTEGRATING ORAL
Status: DISCONTINUED | OUTPATIENT
Start: 2018-08-17 | End: 2018-08-18 | Stop reason: HOSPADM

## 2018-08-17 RX ORDER — ONDANSETRON 2 MG/ML
INJECTION INTRAMUSCULAR; INTRAVENOUS PRN
Status: DISCONTINUED | OUTPATIENT
Start: 2018-08-17 | End: 2018-08-17

## 2018-08-17 RX ORDER — OXYCODONE HYDROCHLORIDE 5 MG/1
5 TABLET ORAL EVERY 4 HOURS PRN
Status: DISCONTINUED | OUTPATIENT
Start: 2018-08-17 | End: 2018-08-18 | Stop reason: HOSPADM

## 2018-08-17 RX ADMIN — KETOROLAC TROMETHAMINE 30 MG: 30 INJECTION, SOLUTION INTRAMUSCULAR; INTRAVENOUS at 13:30

## 2018-08-17 RX ADMIN — ONDANSETRON 4 MG: 2 INJECTION INTRAMUSCULAR; INTRAVENOUS at 13:03

## 2018-08-17 RX ADMIN — BUPIVACAINE HYDROCHLORIDE AND EPINEPHRINE 20 ML: 2.5; 5 INJECTION, SOLUTION INFILTRATION; PERINEURAL at 13:28

## 2018-08-17 RX ADMIN — SODIUM CHLORIDE, SODIUM LACTATE, POTASSIUM CHLORIDE, CALCIUM CHLORIDE: 600; 310; 30; 20 INJECTION, SOLUTION INTRAVENOUS at 12:49

## 2018-08-17 RX ADMIN — ACETAMINOPHEN 975 MG: 325 TABLET ORAL at 10:51

## 2018-08-17 RX ADMIN — PROPOFOL 100 MCG/KG/MIN: 10 INJECTION, EMULSION INTRAVENOUS at 12:57

## 2018-08-17 RX ADMIN — PROPOFOL 30 MG: 10 INJECTION, EMULSION INTRAVENOUS at 13:05

## 2018-08-17 NOTE — BRIEF OP NOTE
[facility]    Brief Operative Note    Pre-operative diagnosis: Anal Dysplasia  Post-operative diagnosis * No post-op diagnosis entered *  Procedure: Procedure(s):  Examination Under Anesthesia with anal microscopy, biopsies and fulguration - Wound Class: IV-Dirty or Infected   - Wound Class: IV-Dirty or Infected  Surgeon: Surgeon(s) and Role:     * Riley Mooney MD - Primary     * Kal Mcconnell MD - Assisting  Anesthesia: Monitor Anesthesia Care   Estimated blood loss: None  Drains: None  Specimens:   ID Type Source Tests Collected by Time Destination   A : Right posterior anal margin Tissue Anus SURGICAL PATHOLOGY EXAM Riley Mooney MD 8/17/2018  1:18 PM    B : Left lateral anal margin Tissue Anus SURGICAL PATHOLOGY EXAM Riley Mooney MD 8/17/2018  1:19 PM      Findings:   Biopsies taken from left lateral and right posterior anal margin then fulgurated. Scattered lesions throughout the anal canal requiring fulguration.  Complications: None.  Implants: None.

## 2018-08-17 NOTE — ANESTHESIA POSTPROCEDURE EVALUATION
Patient: Zain Reynolds    Procedure(s):  Examination Under Anesthesia with anal microscopy, biopsies and fulguration - Wound Class: IV-Dirty or Infected   - Wound Class: IV-Dirty or Infected    Diagnosis:Anal Dysplasia  Diagnosis Additional Information: No value filed.    Anesthesia Type:  MAC    Note:  Anesthesia Post Evaluation    Patient location during evaluation: Phase 2  Patient participation: Able to fully participate in evaluation  Level of consciousness: awake and alert  Pain management: adequate  Airway patency: patent  Cardiovascular status: acceptable  Respiratory status: acceptable  Hydration status: acceptable  PONV: none     Anesthetic complications: None          Last vitals:  Vitals:    08/17/18 0950 08/17/18 1340 08/17/18 1355   BP: 152/81 116/65 128/66   Pulse: 58 66 65   Resp: 16 16 16   Temp: 36.3  C (97.4  F) 36.3  C (97.3  F) 36.3  C (97.3  F)   SpO2: 100% 99% 98%         Electronically Signed By: Anival Brooks MD  August 17, 2018  2:10 PM

## 2018-08-17 NOTE — ANESTHESIA CARE TRANSFER NOTE
Patient: Zain Reynolds    Procedure(s):  Examination Under Anesthesia with anal microscopy, biopsies and fulguration - Wound Class: IV-Dirty or Infected   - Wound Class: IV-Dirty or Infected    Diagnosis: Anal Dysplasia  Diagnosis Additional Information: No value filed.    Anesthesia Type:   MAC     Note:  Airway :Room Air  Patient transferred to:Phase II  Comments: 116/65 97%  97.3-70-18  Handoff Report: Identifed the Patient, Identified the Reponsible Provider, Reviewed the pertinent medical history, Discussed the surgical course, Reviewed Intra-OP anesthesia mangement and issues during anesthesia, Set expectations for post-procedure period and Allowed opportunity for questions and acknowledgement of understanding      Vitals: (Last set prior to Anesthesia Care Transfer)    CRNA VITALS  8/17/2018 1304 - 8/17/2018 1340      8/17/2018             Resp Rate (set): 10                Electronically Signed By: REJI Yepez CRNA  August 17, 2018  1:40 PM

## 2018-08-17 NOTE — IP AVS SNAPSHOT
[facility]    [address] [place] [id number]    Phone:  [phone]    Fax:  [phone]                                       After Visit Summary   8/17/2018    Zain Reynolds    MRN: [id number]           After Visit Summary Signature Page     I have received my discharge instructions, and my questions have been answered. I have discussed any challenges I see with this plan with the nurse or doctor.    ..........................................................................................................................................  Patient/Patient Representative Signature      ..........................................................................................................................................  Patient Representative Print Name and Relationship to Patient    ..................................................               ................................................  Date                                            Time    ..........................................................................................................................................  Reviewed by Signature/Title    ...................................................              ..............................................  Date                                                            Time

## 2018-08-17 NOTE — DISCHARGE INSTRUCTIONS
Anorectal Surgery Instructions    What can I expect after anorectal surgery?  Most anorectal procedures are done as outpatient surgery, and you go home the same day as the procedure. A few surgical procedures will require that you stay in the hospital for about one to three days. No matter where the procedure is done or how long or short it takes, these recommendations will help you heal and feel more comfortable.    Medicines:  The anal area is very sensitive; you can expect to have some pain for up to 2-4 weeks after the procedure. Your doctor will give you a prescription for one or more pain medications.    Take naprosyn 500 mg twice a day OR ibuprofen 600 mg four times a day     Take this on a regular basis (not as needed) following your surgery.     The drugs are best taken with food.  Do not take if it causes stomach upset or if you have a history of ulcers or gastritis. You can stop the naprosyn (or ibuprofen) or reduce the dose when you are feeling better.    DO NOT use naprosyn, ibuprofen, or other similar agents (eg. Advil or Aleve) if you have inflammatory bowel disease (Ulcerative Colitis or Crohn's disease) or if your doctor as advised you against using these medications    Take acetominaphen (Tylenol) 650-1000 mg four times a day.     Take this on a regular basis (not as needed) following surgery for pain control.     Take the lower dose if you are >65 years old or have liver disease. The maximum dose of acetominaphen is 4000 mg a day. You can stop the acetaminophen or reduce the dose when you are feeling better.    It is important to realize that many narcotic pain relievers (including vicodin, percocet, tylenol #3) also have acetaminophen, and excessive doses of acetaminophen can be dangerous, so do not take these in addition to acetominaphen.  You may take narcotics that don't contain acetominaphen such as oxycodone.      Take oxycodone AS NEEDED in addition to the acetominaphen and naprosyn.       Because narcotics have side effects (including constipation), you should reduce your use of these medications as tolerated as your pain improves.    *In general, the best strategy is to take (if you are able to tolerate it) the tylenol and naprosen on a regular basis until your pain has largely gone away. You can take the narcotic pain medicine as needed in addition to the tylenol and ibuprofen. As your pain begins to lessen, you should cut back on your narcotic use while continuing to take your regular tylenol and naprosyn doses.      Refilling prescriptions. If you need additional pain medication, please call the triage nurse at [phone] during normal business hours (8 a.m. to 4 p.m., Monday though Friday) or have your pharmacy fax a refill request to [phone]. If you call after hours or on the weekends, the doctor on call may not know you personally and may not renew narcotic pain medication by phone. Call your primary care provider for all other medication refills.    Perineal care:  External gauze dressing can be removed the morning after surgery. If you have an adhesive dressing stuck to the incision, DO NOT remove this.   Tub baths:    If possible, take a tub bath immediately after each bowel movement.     Baths should be take at least 3 times daily for the first week to 10 days following your procedure. You should soak in the tub for 10 to 15 minutes each time with water as warm as you can tolerate.     Even after you go back to work, it is a good idea to sit in the tub in the morning, after returning from work, and again in the evening before bedtime.    Bleeding/Infection:    You can expect to have some bleeding after bowel movements, but it should stop soon after you wipe.     Use a wet cloth or perianal pad (Tucks or Preparation H pads) to gently wipe the area after each bowel movement.    Do not rub the anal area or use a lot of pressure.    Using a spray bottle filled with warm water helps  loosen any remaining stool. Blot gently with a soft dry cloth or tissue paper.    Infection around the anal opening is not very common. The anal area has excellent blood supply, which helps the area to heal. Bloody discharge after bowel movements is normal and may last 2 to 4 weeks after your surgery. However, if you bleed between bowel movements and cannot get it to stop, call the triage nurse immediately [phone].    Bowel function:  Take a fiber supplement such as Metamucil, which is over the counter. It is important to drink six to eight glasses of water or juice everyday when using fiber products.    If you do not have a bowel movement after 1-2 days:    Take Milk of Magnesia-2 tablespoons.       If there are no results, repeat this or add over the counter Miralax.      If you still do not have results, contact the clinic.     If there are no results, repeat this. Stop taking Milk of Magnesia or other laxatives if you begin to have diarrhea.    * Constipation will cause you to strain when you have a bowel movement. The hard stool will be difficult to pass, will increase pain and bleeding, and will slow down healing.  Try to avoid constipation and/or diarrhea as this can make the pain and bleeding worse.    * It is important to have regular bowel movements at least every other day and to keep your stool soft.  A high fiber diet, including at least four servings of fruits or vegetables daily, will help to keep your bowel movements regular and soft.    Activity:  After your procedure, there are no restrictions on your activity     except restrictions surrounding being on narcotics and in pain, such as no heavy machine operating or driving.     You may walk, climb stairs, ride in a car, and sit as tolerated.     It is helpful to avoid sitting in one position for long periods (2 or more hours).    After some surgeries, you may be told not to perform any lifting (more than 10 pounds) for several weeks after  surgery.    When to call:  When do I need to call the doctor or triage nurse?    If you experience any of the problems listed here, call our triage nurse during business hours ([phone]).     The nurse will help you with your problem or have the doctor call you.     After hours and on weekends, please call the main hospital number ([phone]) and ask for the colon and rectal surgery person on call.     Some is available to help you 24 hours a day, seven days a week.    Call for:   ? Fever greater than 101 degrees   ? Chills   ? Foul-smelling drainage   ? Nausea and vomiting   ? Diarrhea - greater than 3 water stools in 24 hours   ? Constipation - no bowel movement after 3 days   ? Severe bleeding that does not stop soon after a bowel movement   ? Problems with the incision, including increased pain, swelling, or redness    [facility]  Home Care Following Anesthesia  For 24 hours after surgery:  1. Get plenty of rest.  A responsible adult must stay with you for at least 24 hours after you leave the surgery center.  2. Do not drive or use heavy equipment.  If you have weakness or tingling, don't drive or use heavy equipment until this feeling goes away.   3. Do not drink alcohol.   4. Avoid strenuous or risky activities.  Ask for help when climbing stairs.  5. You may feel lightheaded.  IF so, sit for a few minutes before standing.  Have someone help you get up.   6. If you have nausea (feel sick to your stomach): Drink only clear liquids such as apple juice, ginger ale, broth or 7-Up.  Rest may also help.  Be sure to drink enough fluids.  Move to a regular diet as you feel able.   7. You may have a slight fever.  Call the doctor if your fever is over 100 F (37.7 C) (taken under the tongue) or lasts longer than 24 hours.  8. You may have a dry mouth, a sore throat, muscle aches or trouble sleeping. These should go away after 24 hours.  9. Do not make important or legal  "decisions.        Today you received a Marcaine or bupivacaine block to numb the nerves near your surgery site.  This is a block using local anesthetic or \"numbing\" medication injected around the nerves to anesthetize or \"numb\" the area supplied by those nerves.  This block is injected into the muscle layer near your surgical site.  The medication may numb the location where you had surgery for 6-18 hours, but may last up to 24 hours.  If your surgical site is an arm or leg you should be careful with your affected limb, since it is possible to injure your limb without being aware of it due to the numbing.  Until full feeling returns, you should guard against bumping or hitting your limb, and avoid extreme hot or cold temperatures on the skin.  As the block wears off, the feeling will return as a tingling or prickly sensation near your surgical site.  You will experience more discomfort from your incision as the feeling returns.  You may want to take a pain pill (a narcotic or Tylenol if this was prescribed by your surgeon) when you start to experience mild pain before the pain beccomes more severe.  If your pain medications do not control your pain you should notifiy your surgeon.    Tips for taking pain medications  To get the best pain relief possible, remember these points:    Take pain medications as directed, before pain becomes severe.    Pain medication can upset your stomach: taking it with food may help.    Constipation is a common side effect of pain medication. Drink plenty of  fluids.    Eat foods high in fiber. Take a stool softener if recommended by your doctor or pharmacist.    Do not drink alcohol, drive or operate machinery while taking pain medications.    Ask about other ways to control pain, such as with heat, ice or relaxation.    Tylenol/Acetaminophen Consumption  To help encourage the safe use of acetaminophen, the makers of TYLENOL  have lowered the maximum daily dose for single-ingredient " Extra Strength TYLENOL  (acetaminophen) products sold in the [place] from 8 pills per day (4,000 mg) to 6 pills per day (3,000 mg). The dosing interval has also changed from 2 pills every 4-6 hours to 2 pills every 6 hours.    If you feel your pain relief is insufficient, you may take Tylenol/Acetaminophen in addition to your narcotic pain medication.     Be careful not to exceed 3,000 mg of Tylenol/Acetaminophen in a 24 hour period from all sources.    If you are taking extra strength Tylenol/acetaminophen (500 mg), the maximum dose is 6 tablets in 24 hours.    If you are taking regular strength acetaminophen (325 mg), the maximum dose is 9 tablets in 24 hours.    Call a doctor for any of the followin. Signs of infection (fever, growing tenderness at the surgery site, a large amount of drainage or bleeding, severe pain, foul-smelling drainage, redness, swelling).  2. It has been over 8 to 10 hours since surgery and you are still not able to urinate (pass water).  3. Headache for over 24 hours.  4. Numbness, tingling or weakness the day after surgery (if you had spinal anesthesia).  Your doctor is:       Dr. Riley Mooney, Colon Rectal: [phone]               Or dial [phone] and ask for the resident on call for:  Colon Rectal  For emergency care, call the:  [facility]:  [phone] (TTY for hearing impaired: [phone])

## 2018-08-17 NOTE — IP AVS SNAPSHOT
MRN:[id number]                      After Visit Summary   8/17/2018    Zain Reynolds    MRN: [id number]           Thank you!     Thank you for choosing [facility] for your care. Our goal is always to provide you with excellent care. Hearing back from our patients is one way we can continue to improve our services. Please take a few minutes to complete the written survey that you may receive in the mail after you visit with us. Thank you!        Patient Information     Date Of Birth          1955        About your hospital stay     You were admitted on:  August 17, 2018 [facility]    You were discharged on:  August 17, 2018       Who to Call     For medical emergencies, please call 911.  For non-urgent questions about your medical care, please call your primary care provider or clinic, None  For questions related to your surgery, please call your surgery clinic        Attending Provider     Provider Specialty    Riley Mooney MD Colon and Rectal Surgery       Primary Care Provider    None Specified      Further instructions from your care team       Anorectal Surgery Instructions    What can I expect after anorectal surgery?  Most anorectal procedures are done as outpatient surgery, and you go home the same day as the procedure. A few surgical procedures will require that you stay in the hospital for about one to three days. No matter where the procedure is done or how long or short it takes, these recommendations will help you heal and feel more comfortable.    Medicines:  The anal area is very sensitive; you can expect to have some pain for up to 2-4 weeks after the procedure. Your doctor will give you a prescription for one or more pain medications.    Take naprosyn 500 mg twice a day OR ibuprofen 600 mg four times a day     Take this on a regular basis (not as needed) following your surgery.     The drugs are best taken with  food.  Do not take if it causes stomach upset or if you have a history of ulcers or gastritis. You can stop the naprosyn (or ibuprofen) or reduce the dose when you are feeling better.    DO NOT use naprosyn, ibuprofen, or other similar agents (eg. Advil or Aleve) if you have inflammatory bowel disease (Ulcerative Colitis or Crohn's disease) or if your doctor as advised you against using these medications    Take acetominaphen (Tylenol) 650-1000 mg four times a day.     Take this on a regular basis (not as needed) following surgery for pain control.     Take the lower dose if you are >65 years old or have liver disease. The maximum dose of acetominaphen is 4000 mg a day. You can stop the acetaminophen or reduce the dose when you are feeling better.    It is important to realize that many narcotic pain relievers (including vicodin, percocet, tylenol #3) also have acetaminophen, and excessive doses of acetaminophen can be dangerous, so do not take these in addition to acetominaphen.  You may take narcotics that don't contain acetominaphen such as oxycodone.      Take oxycodone AS NEEDED in addition to the acetominaphen and naprosyn.      Because narcotics have side effects (including constipation), you should reduce your use of these medications as tolerated as your pain improves.    *In general, the best strategy is to take (if you are able to tolerate it) the tylenol and naprosen on a regular basis until your pain has largely gone away. You can take the narcotic pain medicine as needed in addition to the tylenol and ibuprofen. As your pain begins to lessen, you should cut back on your narcotic use while continuing to take your regular tylenol and naprosyn doses.      Refilling prescriptions. If you need additional pain medication, please call the triage nurse at [phone] during normal business hours (8 a.m. to 4 p.m., Monday though Friday) or have your pharmacy fax a refill request to [phone]. If you call  after hours or on the weekends, the doctor on call may not know you personally and may not renew narcotic pain medication by phone. Call your primary care provider for all other medication refills.    Perineal care:  External gauze dressing can be removed the morning after surgery. If you have an adhesive dressing stuck to the incision, DO NOT remove this.   Tub baths:    If possible, take a tub bath immediately after each bowel movement.     Baths should be take at least 3 times daily for the first week to 10 days following your procedure. You should soak in the tub for 10 to 15 minutes each time with water as warm as you can tolerate.     Even after you go back to work, it is a good idea to sit in the tub in the morning, after returning from work, and again in the evening before bedtime.    Bleeding/Infection:    You can expect to have some bleeding after bowel movements, but it should stop soon after you wipe.     Use a wet cloth or perianal pad (Tucks or Preparation H pads) to gently wipe the area after each bowel movement.    Do not rub the anal area or use a lot of pressure.    Using a spray bottle filled with warm water helps loosen any remaining stool. Blot gently with a soft dry cloth or tissue paper.    Infection around the anal opening is not very common. The anal area has excellent blood supply, which helps the area to heal. Bloody discharge after bowel movements is normal and may last 2 to 4 weeks after your surgery. However, if you bleed between bowel movements and cannot get it to stop, call the triage nurse immediately [phone].    Bowel function:  Take a fiber supplement such as Metamucil, which is over the counter. It is important to drink six to eight glasses of water or juice everyday when using fiber products.    If you do not have a bowel movement after 1-2 days:    Take Milk of Magnesia-2 tablespoons.       If there are no results, repeat this or add over the counter Miralax.      If you  still do not have results, contact the clinic.     If there are no results, repeat this. Stop taking Milk of Magnesia or other laxatives if you begin to have diarrhea.    * Constipation will cause you to strain when you have a bowel movement. The hard stool will be difficult to pass, will increase pain and bleeding, and will slow down healing.  Try to avoid constipation and/or diarrhea as this can make the pain and bleeding worse.    * It is important to have regular bowel movements at least every other day and to keep your stool soft.  A high fiber diet, including at least four servings of fruits or vegetables daily, will help to keep your bowel movements regular and soft.    Activity:  After your procedure, there are no restrictions on your activity     except restrictions surrounding being on narcotics and in pain, such as no heavy machine operating or driving.     You may walk, climb stairs, ride in a car, and sit as tolerated.     It is helpful to avoid sitting in one position for long periods (2 or more hours).    After some surgeries, you may be told not to perform any lifting (more than 10 pounds) for several weeks after surgery.    When to call:  When do I need to call the doctor or triage nurse?    If you experience any of the problems listed here, call our triage nurse during business hours ([phone]).     The nurse will help you with your problem or have the doctor call you.     After hours and on weekends, please call the main hospital number ([phone]) and ask for the colon and rectal surgery person on call.     Some is available to help you 24 hours a day, seven days a week.    Call for:   ? Fever greater than 101 degrees   ? Chills   ? Foul-smelling drainage   ? Nausea and vomiting   ? Diarrhea - greater than 3 water stools in 24 hours   ? Constipation - no bowel movement after 3 days   ? Severe bleeding that does not stop soon after a bowel movement   ? Problems with the incision, including  "increased pain, swelling, or redness    [facility]  Home Care Following Anesthesia  For 24 hours after surgery:  1. Get plenty of rest.  A responsible adult must stay with you for at least 24 hours after you leave the surgery center.  2. Do not drive or use heavy equipment.  If you have weakness or tingling, don't drive or use heavy equipment until this feeling goes away.   3. Do not drink alcohol.   4. Avoid strenuous or risky activities.  Ask for help when climbing stairs.  5. You may feel lightheaded.  IF so, sit for a few minutes before standing.  Have someone help you get up.   6. If you have nausea (feel sick to your stomach): Drink only clear liquids such as apple juice, ginger ale, broth or 7-Up.  Rest may also help.  Be sure to drink enough fluids.  Move to a regular diet as you feel able.   7. You may have a slight fever.  Call the doctor if your fever is over 100 F (37.7 C) (taken under the tongue) or lasts longer than 24 hours.  8. You may have a dry mouth, a sore throat, muscle aches or trouble sleeping. These should go away after 24 hours.  9. Do not make important or legal decisions.        Today you received a Marcaine or bupivacaine block to numb the nerves near your surgery site.  This is a block using local anesthetic or \"numbing\" medication injected around the nerves to anesthetize or \"numb\" the area supplied by those nerves.  This block is injected into the muscle layer near your surgical site.  The medication may numb the location where you had surgery for 6-18 hours, but may last up to 24 hours.  If your surgical site is an arm or leg you should be careful with your affected limb, since it is possible to injure your limb without being aware of it due to the numbing.  Until full feeling returns, you should guard against bumping or hitting your limb, and avoid extreme hot or cold temperatures on the skin.  As the block wears off, the feeling will return as a " tingling or prickly sensation near your surgical site.  You will experience more discomfort from your incision as the feeling returns.  You may want to take a pain pill (a narcotic or Tylenol if this was prescribed by your surgeon) when you start to experience mild pain before the pain beccomes more severe.  If your pain medications do not control your pain you should notifiy your surgeon.    Tips for taking pain medications  To get the best pain relief possible, remember these points:    Take pain medications as directed, before pain becomes severe.    Pain medication can upset your stomach: taking it with food may help.    Constipation is a common side effect of pain medication. Drink plenty of  fluids.    Eat foods high in fiber. Take a stool softener if recommended by your doctor or pharmacist.    Do not drink alcohol, drive or operate machinery while taking pain medications.    Ask about other ways to control pain, such as with heat, ice or relaxation.    Tylenol/Acetaminophen Consumption  To help encourage the safe use of acetaminophen, the makers of TYLENOL  have lowered the maximum daily dose for single-ingredient Extra Strength TYLENOL  (acetaminophen) products sold in the [place] from 8 pills per day (4,000 mg) to 6 pills per day (3,000 mg). The dosing interval has also changed from 2 pills every 4-6 hours to 2 pills every 6 hours.    If you feel your pain relief is insufficient, you may take Tylenol/Acetaminophen in addition to your narcotic pain medication.     Be careful not to exceed 3,000 mg of Tylenol/Acetaminophen in a 24 hour period from all sources.    If you are taking extra strength Tylenol/acetaminophen (500 mg), the maximum dose is 6 tablets in 24 hours.    If you are taking regular strength acetaminophen (325 mg), the maximum dose is 9 tablets in 24 hours.    Call a doctor for any of the followin. Signs of infection (fever, growing tenderness at the surgery site, a large amount of drainage  "or bleeding, severe pain, foul-smelling drainage, redness, swelling).  2. It has been over 8 to 10 hours since surgery and you are still not able to urinate (pass water).  3. Headache for over 24 hours.  4. Numbness, tingling or weakness the day after surgery (if you had spinal anesthesia).  Your doctor is:       Dr. Riley Mooney, Colon Rectal: [phone]               Or dial [phone] and ask for the resident on call for:  Colon Rectal  For emergency care, call the:  [facility]:  [phone] (TTY for hearing impaired: [phone])                  Pending Results     No orders found from 8/15/2018 to 8/18/2018.            Admission Information     Date & Time Provider Department Dept. Phone    8/17/2018 Riley Mooney MD [facility] [phone]      Your Vitals Were     Blood Pressure Pulse Temperature Respirations Height Weight    152/81 58 97.4  F (36.3  C) (Oral) 16 1.651 m (5' 5\") 56.5 kg (124 lb 9.6 oz)    Pulse Oximetry BMI (Body Mass Index)                100% 20.73 kg/m2          [facility] Information     [facility] gives you secure access to your electronic health record. If you see a primary care provider, you can also send messages to your care team and make appointments. If you have questions, please call your primary care clinic.  If you do not have a primary care provider, please call [phone] and they will assist you.      [facility] is an electronic gateway that provides easy, online access to your medical records. With [facility], you can request a clinic appointment, read your test results, renew a prescription or communicate with your care team.     To access your existing account, please contact your [facility] or call [phone] for assistance.        Care EveryWhere ID     This is your Care EveryWhere ID. This could be used by other organizations to access your [facility] medical records  [id number]        Equal " Access to Services     [facility]: Hadii kim Sheth, waaxda luqadaha, qaybta kaalmajesse valdovinos, chioma trejo. So [facility] [phone].    ATENCIÓN: Si habla español, tiene a hauser disposición servicios gratuitos de asistencia lingüística. Llame al [phone].    We comply with applicable federal civil rights laws and [place] laws. We do not discriminate on the basis of race, color, national origin, age, disability, sex, sexual orientation, or gender identity.               Review of your medicines      CONTINUE these medicines which have NOT CHANGED        Dose / Directions    aspirin 325 MG tablet        Dose:  325 mg   Take 325 mg by mouth every morning   Refills:  0       FLEXERIL PO        Take by mouth At Bedtime   Refills:  0       K-PHOS PO        Take by mouth every morning   Refills:  0       LIPITOR PO        Take by mouth At Bedtime   Refills:  0       METOPROLOL TARTRATE PO        Take by mouth 2 times daily   Refills:  0       MIRTAZAPINE PO        Take by mouth At Bedtime   Refills:  0                Protect others around you: Learn how to safely use, store and throw away your medicines at www.disposemymeds.org.             Medication List: This is a list of all your medications and when to take them. Check marks below indicate your daily home schedule. Keep this list as a reference.      Medications           Morning Afternoon Evening Bedtime As Needed    aspirin 325 MG tablet   Take 325 mg by mouth every morning                                FLEXERIL PO   Take by mouth At Bedtime                                K-PHOS PO   Take by mouth every morning                                LIPITOR PO   Take by mouth At Bedtime                                METOPROLOL TARTRATE PO   Take by mouth 2 times daily                                MIRTAZAPINE PO   Take by mouth At Bedtime

## 2018-08-18 NOTE — OP NOTE
Procedure Date: 08/17/2018      PREOPERATIVE DIAGNOSIS:  Anal dysplasia.      POSTOPERATIVE DIAGNOSIS:  Anal dysplasia.      PROCEDURE:  Examination under anesthesia with high-definition anal microscopy, biopsies, and fulguration of anal dysplasia.      HISTORY:  This 63-year-old HIV-positive man was found to have HSIL on anal cytology.  Followup anal microscopy documented AIN 2.  The patient was referred for high-definition anal microscopy with biopsies as indicated and fulguration in the operating room.  All risks and alternatives were outlined in detail with the patient.  We discussed the high risk of recurrent dysplasia.  I answered all the patient's questions to his stated satisfaction.  He expresses understanding and provided informed consent.      SURGEON:  Riley Mooney MD      ASSISTANT:  Kal Mcconnell MD      DESCRIPTION OF PROCEDURE:  With the patient in the left lateral decubitus position on the split leg table with the buttocks taped apart, the perianal skin was prepped and draped in a sterile fashion.  A timeout was performed and the patient and procedure confirmed.  Local infiltration of 0.25% Marcaine with epinephrine was utilized and a satisfactory perianal block was obtained.  The anal canal and perianal skin were soaked with 5% acetic acid and high-definition anal microscopy was performed using the colposcope.  Externally, there were 2 small areas of intense acetowhitening, one in the right posterolateral position and a second one in the left lateral position.  The larger of these measured about 1 cm and was right posterolateral.  Each of these lesions were biopsied and destroyed with cautery.  Anteriorly, there was a tiny 3 mm area of intense acetowhitening that also was destroyed.  Within the anal canal, there were several quite limited patches of intense acetowhitening with fine micropunctation.  These were destroyed with electrocautery.  They were quite limited in scope.  Careful  inspection of the remaining anal canal showed no other pathology.  The procedure was accordingly terminated.      ESTIMATED BLOOD LOSS:  Nil.        The patient tolerated the procedure well without evident complications.  Sponge, needle and instrument counts were reported as correct at the conclusion of the case x 2.         REBECCA PERDUE MD             D: 2018   T: 2018   MT: laura      Name:     KIRA ORDONEZ   MRN:      [id number]        Account:        [id number]   :      1955           Procedure Date: 2018      Document: [id number]       cc: Mechelle Milner MSN, NP-C       SALMA CLARK MD       [facility]

## 2018-08-20 ENCOUNTER — CARE COORDINATION (OUTPATIENT)
Dept: SURGERY | Facility: CLINIC | Age: 63
End: 2018-08-20

## 2018-08-20 NOTE — PROGRESS NOTES
RN Post Op Care Coordination Note     Called to check on patient postoperatively after hospital discharge.       Patient is s/p Examination Under Anesthesia with anal microscopy, biopsies and fulguration.      Pain is well controlled with no interventions.     Patient is eating and drinking normally. Encouraged patient to drink 8-10 glasses of water a day.     Patient is passing flats, is having soft brown bowel movements.    Patient is voiding normally and urine is light in color.    Patient Denies nausea and vomiting.    Patient Denies any fevers or chills.    Follow up is not set up, awaiting pathology to determine plan of care.   Encouraged the patient to contact the clinic in the meantime with any fevers, chills, nausea, vomiting, dizziness, lightheadedness, uncontrolled pain, changes to the incisions, or with any questions or concerns.    Patient's questions were answered to their stated satisfaction and they are in agreement with this plan.    LORETTA Albright Care Coordinator  Colon & Rectal Surgery Clinic  [facility]  [phone]

## 2018-08-20 NOTE — PROGRESS NOTES
Patient was called to assess how he is doing after his procedure. The direct phone number was left in a voicemail with a request for the patient to call back at his earliest convenience.     LORETTA Albright Care Coordinator  Colon & Rectal Surgery Clinic  Phone: [phone]

## 2018-08-24 LAB — COPATH REPORT: NORMAL

## 2019-10-31 ENCOUNTER — OFFICE VISIT (OUTPATIENT)
Dept: SURGERY | Facility: CLINIC | Age: 64
End: 2019-10-31
Payer: COMMERCIAL

## 2019-10-31 VITALS
HEIGHT: 65 IN | BODY MASS INDEX: 19.86 KG/M2 | SYSTOLIC BLOOD PRESSURE: 116 MMHG | HEART RATE: 86 BPM | OXYGEN SATURATION: 99 % | WEIGHT: 119.2 LBS | DIASTOLIC BLOOD PRESSURE: 63 MMHG

## 2019-10-31 DIAGNOSIS — K62.82 ANAL DYSPLASIA: Primary | ICD-10-CM

## 2019-10-31 RX ORDER — FUROSEMIDE 20 MG
20 TABLET ORAL DAILY
COMMUNITY

## 2019-10-31 RX ORDER — ABACAVIR 300 MG/1
300 TABLET ORAL 2 TIMES DAILY
COMMUNITY

## 2019-10-31 RX ORDER — HYDRALAZINE HYDROCHLORIDE 10 MG/1
10 TABLET, FILM COATED ORAL 3 TIMES DAILY
COMMUNITY

## 2019-10-31 RX ORDER — LAMIVUDINE 150 MG/1
150 TABLET, FILM COATED ORAL 2 TIMES DAILY
COMMUNITY

## 2019-10-31 ASSESSMENT — MIFFLIN-ST. JEOR: SCORE: 1252.57

## 2019-10-31 NOTE — LETTER
10/31/2019       RE: Zain Reynolds  [address]  [facility] [address]     Dear Colleague,    Thank you for referring your patient, Zain Reynolds, to the [facility] COLON AND RECTAL SURGERY at [facility]. Please see a copy of my visit note below.      Colon and Rectal Surgery Clinic High Resolution Anoscopy Note    RE: Zain Reynolds  : 1955  AKI: 10/31/2019    Zain Reynolds is a 63 year old HIV positive male with a history of HSIL on anal cytology on 18 who I saw on 7/10/18 for high resolution anoscopy with biopsies showing AIN 2. He subsequently underwent high resolution anoscopy in the OR with Dr. 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     HPI: Zain has been doing well. 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 He quit smoking 3 years ago. He has had anoreceptive intercourse in the past but not for the past 30 years. His HIV is under good control. He gets occasional bright red blood with bowel movements if they are hard but no other anorectal complaints.  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 He is on daily aspirin. He was recently diagnosed with CHF and colonoscopy has been delayed for workup of this. Family history of prostate and breast cancer, but no family history of colon cancer.    ASSESSMENT: Written, informed consent was obtained prior to procedure.  Prior to the start of the procedure and with procedural staff participation, I verbally confirmed the patient s identity using two indicators, relevant allergies, that the procedure was appropriate and matched the consent or emergent situation, and that the correct equipment/implants were available. Immediately prior to starting the procedure I conducted the Time Out with the procedural staff and re-confirmed the patient s name, procedure, and site/side. (The Joint Commission universal protocol was  followed.)  Yes    Sedation (Moderate or Deep): None    Anal cytology was obtained with Dacron swab. Digital anal rectal exam was performed without any palpable lesions. Dilute acetic acid soak was completed for 2 minutes. Lubricant was used to insert the anoscope. Performed high resolution microscopy using the colposcope. The dentate line was viewed in its entirety and was quite subtle. Anal canal is fairly short. Scattered very mild acetowhitening without punctation in the distal anal canal.  The perianal area was inspected after acetic acid soak. The findings noted were some sebaceous cysts with waxy exudate but no additional acetowhitening, punctation, or verruciform lesions.     The patient tolerated the procedures well.    PLAN: Repeat high resolution anoscopy in 6 months[facility] Patient's questions were answered to his stated satisfaction and he is in agreement with this plan.    For details of past medical history, surgical history, family history, medications, allergies, and review of systems, please see details below.    Medical history:  Past Medical History:   Diagnosis Date     Anal dysplasia      Arthritis      CAD (coronary artery disease)      Cataracts, bilateral      Hepatitis      HIV (human immunodeficiency virus infection) (H)      Hyperlipemia      Hypertension      Tongue dysplasia        Surgical history:  Past Surgical History:   Procedure Laterality Date     ANOSCOPY N/A 8/17/2018    Procedure: ANOSCOPY;;  Surgeon: Riley Mooney MD;  [facility]: [facility]     C CABG, ARTERY-VEIN, TWO  03/03/2003     CATARACT IOL, RT/LT       endarectomy Left 2015     endartectomy Right 2003     EXAM UNDER ANESTHESIA ANUS N/A 8/17/2018    Procedure: EXAM UNDER ANESTHESIA ANUS;  Examination Under Anesthesia with anal microscopy, biopsies and fulguration;  Surgeon: Riley Mooney MD;  Location: [facility] OR       Family history:  Family History   Problem Relation Age of Onset     Alzheimer Disease  "Mother      Coronary Artery Disease Father        Medications:  Current Outpatient Medications   Medication Sig Dispense Refill     abacavir (ZIAGEN) 300 MG tablet Take 300 mg by mouth 2 times daily       aspirin 325 MG tablet Take 81 mg by mouth every morning        Atorvastatin Calcium (LIPITOR PO) Take by mouth At Bedtime       Cyclobenzaprine HCl (FLEXERIL PO) Take by mouth At Bedtime       dolutegravir (TIVICAY) 50 MG tablet Take 50 mg by mouth daily       furosemide (LASIX) 20 MG tablet Take 20 mg by mouth daily       hydrALAZINE (APRESOLINE) 10 MG tablet Take 10 mg by mouth 3 times daily       lamiVUDine (EPIVIR) 150 MG tablet Take 150 mg by mouth 2 times daily       METOPROLOL TARTRATE PO Take by mouth 2 times daily       MIRTAZAPINE PO Take by mouth At Bedtime       Potassium Phosphate Monobasic (K-PHOS PO) Take by mouth every morning       Allergies:  The patienthas No Known Allergies.    Social history:  Social History     Tobacco Use     Smoking status: Former Smoker     Packs/day: 1.50     Years: 39.00     Pack years: 58.50     Types: Cigarettes     Last attempt to quit: 2003     Years since quittin.2     Smokeless tobacco: Never Used   Substance Use Topics     Alcohol use: Yes     Comment: Rare     Marital status: single.    Review of Systems:  Nursing Notes:   Karina Camargo EMT  10/31/2019  9:53 AM  Signed  Chief Complaint   Patient presents with     High Resolution Anoscopy       Vitals:    10/31/19 0950   BP: 116/63   BP Location: Left arm   Patient Position: Sitting   Cuff Size: Adult Regular   Pulse: 86   SpO2: 99%   Weight: 119 lb 3.2 oz   Height: 5' 5\"       Body mass index is 19.84 kg/m .      GEORGI Wilburn                         This procedure was performed under a collaborative agreement with Dr. Riley Mooney MD, Chief of Colon and Rectal Surgery, [facility].    Mechelle Flores NP-C  Colon and Rectal Surgery  [facility]

## 2019-10-31 NOTE — PROGRESS NOTES
Colon and Rectal Surgery Clinic High Resolution Anoscopy Note    RE: Zain Reynolds  : 1955  AKI: 10/31/2019    Zain Reynolds is a 63 year old HIV positive male with a history of HSIL on anal cytology on 18 who I saw on 7/10/18 for high resolution anoscopy with biopsies showing AIN 2. He subsequently underwent high resolution anoscopy in the OR with Dr. 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     HPI: Zain has been doing well. 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 He quit smoking 3 years ago. He has had anoreceptive intercourse in the past but not for the past 30 years. His HIV is under good control. He gets occasional bright red blood with bowel movements if they are hard but no other anorectal complaints.  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 He is on daily aspirin. He was recently diagnosed with CHF and colonoscopy has been delayed for workup of this. Family history of prostate and breast cancer, but no family history of colon cancer.    ASSESSMENT: Written, informed consent was obtained prior to procedure.  Prior to the start of the procedure and with procedural staff participation, I verbally confirmed the patient s identity using two indicators, relevant allergies, that the procedure was appropriate and matched the consent or emergent situation, and that the correct equipment/implants were available. Immediately prior to starting the procedure I conducted the Time Out with the procedural staff and re-confirmed the patient s name, procedure, and site/side. (The Joint Commission universal protocol was followed.)  Yes    Sedation (Moderate or Deep): None    Anal cytology was obtained with Dacron swab. Digital anal rectal exam was performed without any palpable lesions. Dilute acetic acid soak was completed for 2 minutes. Lubricant was used to insert the anoscope. Performed high resolution microscopy  using the colposcope. The dentate line was viewed in its entirety and was quite subtle. Anal canal is fairly short. Scattered very mild acetowhitening without punctation in the distal anal canal.  The perianal area was inspected after acetic acid soak. The findings noted were some sebaceous cysts with waxy exudate but no additional acetowhitening, punctation, or verruciform lesions.     The patient tolerated the procedures well.    PLAN: Repeat high resolution anoscopy in 6 months[facility] Patient's questions were answered to his stated satisfaction and he is in agreement with this plan.    For details of past medical history, surgical history, family history, medications, allergies, and review of systems, please see details below.    Medical history:  Past Medical History:   Diagnosis Date     Anal dysplasia      Arthritis      CAD (coronary artery disease)      Cataracts, bilateral      Hepatitis      HIV (human immunodeficiency virus infection) (H)      Hyperlipemia      Hypertension      Tongue dysplasia        Surgical history:  Past Surgical History:   Procedure Laterality Date     ANOSCOPY N/A 8/17/2018    Procedure: ANOSCOPY;;  Surgeon: Riley Mooney MD;  [facility]: [facility]     C CABG, ARTERY-VEIN, TWO  03/03/2003     CATARACT IOL, RT/LT       endarectomy Left 2015     endartectomy Right 2003     EXAM UNDER ANESTHESIA ANUS N/A 8/17/2018    Procedure: EXAM UNDER ANESTHESIA ANUS;  Examination Under Anesthesia with anal microscopy, biopsies and fulguration;  Surgeon: Riley Mooney MD;  Location: [facility] OR       Family history:  Family History   Problem Relation Age of Onset     Alzheimer Disease Mother      Coronary Artery Disease Father        Medications:  Current Outpatient Medications   Medication Sig Dispense Refill     abacavir (ZIAGEN) 300 MG tablet Take 300 mg by mouth 2 times daily       aspirin 325 MG tablet Take 81 mg by mouth every morning        Atorvastatin Calcium (LIPITOR PO) Take  "by mouth At Bedtime       Cyclobenzaprine HCl (FLEXERIL PO) Take by mouth At Bedtime       dolutegravir (TIVICAY) 50 MG tablet Take 50 mg by mouth daily       furosemide (LASIX) 20 MG tablet Take 20 mg by mouth daily       hydrALAZINE (APRESOLINE) 10 MG tablet Take 10 mg by mouth 3 times daily       lamiVUDine (EPIVIR) 150 MG tablet Take 150 mg by mouth 2 times daily       METOPROLOL TARTRATE PO Take by mouth 2 times daily       MIRTAZAPINE PO Take by mouth At Bedtime       Potassium Phosphate Monobasic (K-PHOS PO) Take by mouth every morning       Allergies:  The patienthas No Known Allergies.    Social history:  Social History     Tobacco Use     Smoking status: Former Smoker     Packs/day: 1.50     Years: 39.00     Pack years: 58.50     Types: Cigarettes     Last attempt to quit: 2003     Years since quittin.2     Smokeless tobacco: Never Used   Substance Use Topics     Alcohol use: Yes     Comment: Rare     Marital status: single.    Review of Systems:  Nursing Notes:   Karina Camargo EMT  10/31/2019  9:53 AM  Signed  Chief Complaint   Patient presents with     High Resolution Anoscopy       Vitals:    10/31/19 0950   BP: 116/63   BP Location: Left arm   Patient Position: Sitting   Cuff Size: Adult Regular   Pulse: 86   SpO2: 99%   Weight: 119 lb 3.2 oz   Height: 5' 5\"       Body mass index is 19.84 kg/m .      GEORGI Wilburn                         This procedure was performed under a collaborative agreement with Dr. Riley Mooney MD, Chief of Colon and Rectal Surgery, [facility].    Mechelle Flores NP-C  Colon and Rectal Surgery  [facility]      This note was created using speech recognition software and may contain unintended word substitutions.  "

## 2019-10-31 NOTE — NURSING NOTE
"Chief Complaint   Patient presents with     High Resolution Anoscopy       Vitals:    10/31/19 0950   BP: 116/63   BP Location: Left arm   Patient Position: Sitting   Cuff Size: Adult Regular   Pulse: 86   SpO2: 99%   Weight: 119 lb 3.2 oz   Height: 5' 5\"       Body mass index is 19.84 kg/m .      Karina Camargo, EMT                      "

## 2019-11-01 LAB — COPATH REPORT: NORMAL

## 2019-11-08 ENCOUNTER — HEALTH MAINTENANCE LETTER (OUTPATIENT)
Age: 64
End: 2019-11-08

## 2020-02-23 ENCOUNTER — HEALTH MAINTENANCE LETTER (OUTPATIENT)
Age: 65
End: 2020-02-23

## 2020-12-06 ENCOUNTER — HEALTH MAINTENANCE LETTER (OUTPATIENT)
Age: 65
End: 2020-12-06

## 2021-02-20 ENCOUNTER — HEALTH MAINTENANCE LETTER (OUTPATIENT)
Age: 66
End: 2021-02-20

## 2021-04-11 ENCOUNTER — HEALTH MAINTENANCE LETTER (OUTPATIENT)
Age: 66
End: 2021-04-11

## 2021-08-16 ENCOUNTER — DOCUMENTATION ONLY (OUTPATIENT)
Dept: SURGERY | Facility: CLINIC | Age: 66
End: 2021-08-16

## 2021-08-16 ENCOUNTER — TRANSCRIBE ORDERS (OUTPATIENT)
Dept: OTHER | Age: 66
End: 2021-08-16

## 2021-08-16 DIAGNOSIS — R85.613 HIGH GRADE SQUAMOUS INTRAEPITHELIAL LESION ON CYTOLOGIC SMEAR OF ANUS (HGSIL): Primary | ICD-10-CM

## 2021-08-16 NOTE — PROGRESS NOTES
Action 08/16/2021 JJOON 2:01pm   Action Taken CSS received records from [facility]. Records sent to scanning for processing.

## 2021-09-20 ENCOUNTER — TELEPHONE (OUTPATIENT)
Dept: SURGERY | Facility: CLINIC | Age: 66
End: 2021-09-20

## 2021-09-20 NOTE — TELEPHONE ENCOUNTER
Called pt in regards message below. Pt stated that he was waiting for instruction to prep for upcoming [facility] appointment on 10/07/2021with MICHA Fuchs. Informed pt that there is no special instructions for prepping for this type for appointment. Explained to pt the type of procedure and what to expect on this clinical procedure.       Emilie Ramirez, CMA

## 2021-09-20 NOTE — TELEPHONE ENCOUNTER
[facility] Call Center    Phone Message    May a detailed message be left on voicemail: yes     Reason for Call: Other: Per pt still has not received instructions for the prep of the appt on 10/07. Please mail out the instructions to pt. Thank you.      Action Taken: Message routed to:  [facility] Colon adn Rec    Travel Screening: Not Applicable

## 2021-09-25 ENCOUNTER — HEALTH MAINTENANCE LETTER (OUTPATIENT)
Age: 66
End: 2021-09-25

## 2021-10-07 ENCOUNTER — TELEPHONE (OUTPATIENT)
Dept: SURGERY | Facility: CLINIC | Age: 66
End: 2021-10-07

## 2021-10-07 NOTE — TELEPHONE ENCOUNTER
Pt had to cancel 10/7 micro appt with Mechelle due to car breaking down. Called pt back to reschedule. Patient stated he didn't want to reschedule until car was repaired. Gave patient pod number to call back when he's ready.

## 2022-05-07 ENCOUNTER — HEALTH MAINTENANCE LETTER (OUTPATIENT)
Age: 67
End: 2022-05-07

## 2022-06-02 ENCOUNTER — TELEPHONE (OUTPATIENT)
Dept: SURGERY | Facility: CLINIC | Age: 67
End: 2022-06-02

## 2022-06-02 NOTE — TELEPHONE ENCOUNTER
LVM to [facility] Please Schedule This Appointment:     With: Mechelle Young NP     Referring Provider: self     For / Appt Notes: micro     Appt Type: LASHAUNP Mirco     Appt Date/Time: next available

## 2022-06-09 ENCOUNTER — TELEPHONE (OUTPATIENT)
Dept: SURGERY | Facility: CLINIC | Age: 67
End: 2022-06-09

## 2022-06-09 NOTE — TELEPHONE ENCOUNTER
Spoke to patient. Patient will call back to schedule when he's ready and after he fighures out his other health & upcoming surgeries issues.

## 2022-09-02 ENCOUNTER — TELEPHONE (OUTPATIENT)
Dept: SURGERY | Facility: CLINIC | Age: 67
End: 2022-09-02

## 2022-09-02 NOTE — TELEPHONE ENCOUNTER
Called pt to schedule micro. Pt needs to discuss with  and will call back L pod to schedule.        Please Schedule This Appointment:     With: Mechelle Young NP     Referring Provider: self     For / Appt Notes: micro     Appt Type: Microscopy     Appt Date/Time: next available     [facility]and[facility] funes [facility]     Thank you!

## 2022-09-02 NOTE — LETTER
Kofi Pittman,      We are unable to reach you by phone to help you schedule an appointment with Mechelle Young.  Please call us at [phone] to schedule your appointment.         Thank you,    [facility]  [phone]

## 2022-12-30 ENCOUNTER — TELEPHONE (OUTPATIENT)
Dept: SURGERY | Facility: CLINIC | Age: 67
End: 2022-12-30

## 2022-12-30 NOTE — TELEPHONE ENCOUNTER
CHHAYAM and sent [employer] for scheduling:  With: Mechelle Young NP   Referring Provider: self   For / Appt Notes: micro   Appt Type: Microscopy   Appt Date/Time: next available

## 2023-01-07 ENCOUNTER — HEALTH MAINTENANCE LETTER (OUTPATIENT)
Age: 68
End: 2023-01-07

## 2023-05-03 ENCOUNTER — TELEPHONE (OUTPATIENT)
Dept: SURGERY | Facility: CLINIC | Age: 68
End: 2023-05-03

## 2023-05-03 NOTE — TELEPHONE ENCOUNTER
ADDISON, sent [facility] [employer] to schedule the following appointment:    With: Mechelle Young NP     Referring Provider: self     For / Appt Notes: micro     Appt Type: Microscopy     Appt Date/Time: next available       Left K pod #

## 2023-06-02 ENCOUNTER — HEALTH MAINTENANCE LETTER (OUTPATIENT)
Age: 68
End: 2023-06-02

## 2023-07-15 ENCOUNTER — HEALTH MAINTENANCE LETTER (OUTPATIENT)
Age: 68
End: 2023-07-15

## 2024-02-10 ENCOUNTER — HEALTH MAINTENANCE LETTER (OUTPATIENT)
Age: 69
End: 2024-02-10

## (undated) DEVICE — DRSG TELFA 3X8" 1238

## (undated) DEVICE — STRAP KNEE/BODY 31143004

## (undated) DEVICE — DRSG GAUZE 4X4" TRAY 6939

## (undated) DEVICE — TAPE DURAPORE 2"X1.5YD SILK 1538S-2

## (undated) DEVICE — TAPE DURAPORE 3" SILK 1538-3

## (undated) DEVICE — ANOSCOPE PLASTIC CLEAR UNSTERILE 82420

## (undated) DEVICE — PREP TECHNI-CARE CHLOROXYLENOL 3% 4OZ BOTTLE C222-4ZWO

## (undated) DEVICE — PAD CHUX UNDERPAD 30X30"

## (undated) DEVICE — GLOVE PROTEXIS MICRO 7.0  2D73PM70

## (undated) DEVICE — GLOVE PROTEXIS BLUE W/NEU-THERA 7.5  2D73EB75

## (undated) DEVICE — SOL WATER IRRIG 500ML BOTTLE 2F7113

## (undated) DEVICE — RX BACITRACIN OINTMENT 0.9G 1/32OZ CUR001109

## (undated) DEVICE — ESU GROUND PAD ADULT W/CORD E7507

## (undated) DEVICE — PANTIES MESH LG/XLG 2PK 706M2

## (undated) DEVICE — PACK RECTAL KIT CUSTOM ASC

## (undated) DEVICE — LINEN TOWEL PACK X5 5464

## (undated) DEVICE — SUCTION MANIFOLD NEPTUNE 2 SYS 1 PORT 702-025-000

## (undated) RX ORDER — KETOROLAC TROMETHAMINE 30 MG/ML
INJECTION, SOLUTION INTRAMUSCULAR; INTRAVENOUS
Status: DISPENSED
Start: 2018-08-17

## (undated) RX ORDER — PROPOFOL 10 MG/ML
INJECTION, EMULSION INTRAVENOUS
Status: DISPENSED
Start: 2018-08-17

## (undated) RX ORDER — ACETIC ACID 5 %
LIQUID (ML) MISCELLANEOUS
Status: DISPENSED
Start: 2019-10-31

## (undated) RX ORDER — BUPIVACAINE HYDROCHLORIDE AND EPINEPHRINE 5; 5 MG/ML; UG/ML
INJECTION, SOLUTION EPIDURAL; INTRACAUDAL; PERINEURAL
Status: DISPENSED
Start: 2018-08-17

## (undated) RX ORDER — ACETAMINOPHEN 325 MG/1
TABLET ORAL
Status: DISPENSED
Start: 2018-08-17

## (undated) RX ORDER — ONDANSETRON 2 MG/ML
INJECTION INTRAMUSCULAR; INTRAVENOUS
Status: DISPENSED
Start: 2018-08-17

## (undated) RX ORDER — BUPIVACAINE HYDROCHLORIDE 2.5 MG/ML
INJECTION, SOLUTION EPIDURAL; INFILTRATION; INTRACAUDAL
Status: DISPENSED
Start: 2018-08-17

## (undated) RX ORDER — LIDOCAINE HYDROCHLORIDE AND EPINEPHRINE 10; 10 MG/ML; UG/ML
INJECTION, SOLUTION INFILTRATION; PERINEURAL
Status: DISPENSED
Start: 2018-07-10

## (undated) RX ORDER — ACETIC ACID 5 %
LIQUID (ML) MISCELLANEOUS
Status: DISPENSED
Start: 2018-08-17

## (undated) RX ORDER — EPINEPHRINE 1 MG/ML
INJECTION, SOLUTION, CONCENTRATE INTRAVENOUS
Status: DISPENSED
Start: 2018-08-17

## (undated) RX ORDER — FERRIC SUBSULFATE 0.21 G/G
LIQUID TOPICAL
Status: DISPENSED
Start: 2018-07-10

## (undated) RX ORDER — ACETIC ACID 5 %
LIQUID (ML) MISCELLANEOUS
Status: DISPENSED
Start: 2018-07-10

## (undated) RX ORDER — FERRIC SUBSULFATE 0.21 G/G
LIQUID TOPICAL
Status: DISPENSED
Start: 2019-10-31

## (undated) RX ORDER — BUPIVACAINE HYDROCHLORIDE 2.5 MG/ML
INJECTION, SOLUTION INFILTRATION; PERINEURAL
Status: DISPENSED
Start: 2018-08-17

## (undated) RX ORDER — LIDOCAINE HYDROCHLORIDE AND EPINEPHRINE 10; 10 MG/ML; UG/ML
INJECTION, SOLUTION INFILTRATION; PERINEURAL
Status: DISPENSED
Start: 2019-10-31

## (undated) RX ORDER — FENTANYL CITRATE 50 UG/ML
INJECTION, SOLUTION INTRAMUSCULAR; INTRAVENOUS
Status: DISPENSED
Start: 2018-08-17